# Patient Record
Sex: MALE | Race: WHITE | NOT HISPANIC OR LATINO | Employment: UNEMPLOYED | URBAN - METROPOLITAN AREA
[De-identification: names, ages, dates, MRNs, and addresses within clinical notes are randomized per-mention and may not be internally consistent; named-entity substitution may affect disease eponyms.]

---

## 2017-06-23 ENCOUNTER — TELEPHONE (OUTPATIENT)
Dept: FAMILY MEDICINE | Facility: CLINIC | Age: 67
End: 2017-06-23

## 2017-06-23 NOTE — TELEPHONE ENCOUNTER
Ridge Hutchison is a 67 year old male who calls with right testicle pain/stones, crease/hip area.    NURSING ASSESSMENT:  The pain began 2 years ago, but worse the last 2 month. Intermittent & has been seen by a specialist for it & had an US.    Pain scale (0-10): 5/10  The pain is described as twitch and is located RLQ, which is without radiation.  Symptom associated with the abdominal pain: none.  Patient has not had previous abdominal surgery, including none.  Pain is aggravated by movement & lifting, and relieved by antibiotics.  No fever. He's wondering if it is hip strain/pulled muscle, hernia or infection. He will monitor for fever, vomiting, diarrhea or any other symptoms & will go to  over the weekend. Patient verbalized understanding.       Allergies:   Allergies   Allergen Reactions     No Known Drug Allergies        NURSING PLAN: Nursing advice to patient be seen as scheduled next week.    RECOMMENDED DISPOSITION:    Will comply with recommendation: Yes  If further questions/concerns or if symptoms do not improve, worsen or new symptoms develop, call your PCP or [facility] as soon as possible.    Guideline used:  Telephone Triage Protocols for Nurses, Fifth Edition, Diana Cline RN

## 2017-06-23 NOTE — TELEPHONE ENCOUNTER
Reason for call:  Patient reporting a symptom    Symptom or request: groin pain    Additional comments: GREGG Mari triaging patient.     Phone Number patient can be reached at:  Home number on file [phone] (home)        Call taken on 6/23/2017 at 8:39 AM by Shona Del Valle

## 2017-06-28 ENCOUNTER — OFFICE VISIT (OUTPATIENT)
Dept: FAMILY MEDICINE | Facility: CLINIC | Age: 67
End: 2017-06-28
Payer: MEDICARE

## 2017-06-28 VITALS
BODY MASS INDEX: 28.14 KG/M2 | TEMPERATURE: 97.6 F | DIASTOLIC BLOOD PRESSURE: 86 MMHG | SYSTOLIC BLOOD PRESSURE: 122 MMHG | WEIGHT: 190 LBS | HEART RATE: 72 BPM | HEIGHT: 69 IN

## 2017-06-28 DIAGNOSIS — Z13.1 SCREENING FOR DIABETES MELLITUS: ICD-10-CM

## 2017-06-28 DIAGNOSIS — Z12.5 SPECIAL SCREENING FOR MALIGNANT NEOPLASM OF PROSTATE: ICD-10-CM

## 2017-06-28 DIAGNOSIS — Z11.59 NEED FOR HEPATITIS C SCREENING TEST: ICD-10-CM

## 2017-06-28 DIAGNOSIS — Z13.6 SCREENING FOR HEART DISEASE: ICD-10-CM

## 2017-06-28 DIAGNOSIS — Z12.11 SPECIAL SCREENING FOR MALIGNANT NEOPLASMS, COLON: ICD-10-CM

## 2017-06-28 DIAGNOSIS — Z00.00 ROUTINE GENERAL MEDICAL EXAMINATION AT A HEALTH CARE FACILITY: Primary | ICD-10-CM

## 2017-06-28 DIAGNOSIS — Z23 NEED FOR PROPHYLACTIC VACCINATION AGAINST STREPTOCOCCUS PNEUMONIAE (PNEUMOCOCCUS): ICD-10-CM

## 2017-06-28 PROCEDURE — 90670 PCV13 VACCINE IM: CPT | Performed by: FAMILY MEDICINE

## 2017-06-28 PROCEDURE — G0472 HEP C SCREEN HIGH RISK/OTHER: HCPCS | Performed by: FAMILY MEDICINE

## 2017-06-28 PROCEDURE — 36415 COLL VENOUS BLD VENIPUNCTURE: CPT | Performed by: FAMILY MEDICINE

## 2017-06-28 PROCEDURE — 80061 LIPID PANEL: CPT | Performed by: FAMILY MEDICINE

## 2017-06-28 PROCEDURE — 86803 HEPATITIS C AB TEST: CPT | Performed by: FAMILY MEDICINE

## 2017-06-28 PROCEDURE — G0009 ADMIN PNEUMOCOCCAL VACCINE: HCPCS | Performed by: FAMILY MEDICINE

## 2017-06-28 PROCEDURE — 82947 ASSAY GLUCOSE BLOOD QUANT: CPT | Performed by: FAMILY MEDICINE

## 2017-06-28 PROCEDURE — G0438 PPPS, INITIAL VISIT: HCPCS | Performed by: FAMILY MEDICINE

## 2017-06-28 PROCEDURE — G0103 PSA SCREENING: HCPCS | Performed by: FAMILY MEDICINE

## 2017-06-28 RX ORDER — ASPIRIN 325 MG
TABLET ORAL
COMMUNITY
End: 2024-02-19

## 2017-06-28 NOTE — MR AVS SNAPSHOT
After Visit Summary   6/28/2017    Ridge Hutchison    MRN: [id number]           Patient Information     Date Of Birth          1950        Visit Information        Provider Department      6/28/2017 1:00 PM Jose Delarosa MD [facility]        Today's Diagnoses     Routine general medical examination at a health care facility    -  1    Need for hepatitis C screening test        Need for prophylactic vaccination against Streptococcus pneumoniae (pneumococcus)        Special screening for malignant neoplasm of prostate        Special screening for malignant neoplasms, colon        Screening for diabetes mellitus        Screening for heart disease          Care Instructions      Preventive Health Recommendations:       Male Ages 65 and over    Yearly exam:             See your health care provider every year in order to  o   Review health changes.   o   Discuss preventive care.    o   Review your medicines if your doctor has prescribed any.    Talk with your health care provider about whether you should have a test to screen for prostate cancer (PSA).    Every 3 years, have a diabetes test (fasting glucose). If you are at risk for diabetes, you should have this test more often.    Every 5 years, have a cholesterol test. Have this test more often if you are at risk for high cholesterol or heart disease.     Every 10 years, have a colonoscopy. Or, have a yearly FIT test (stool test). These exams will check for colon cancer.    Talk to with your health care provider about screening for Abdominal Aortic Aneurysm if you have a family history of AAA or have a history of smoking.  Shots:     Get a flu shot each year.     Get a tetanus shot every 10 years.     Talk to your doctor about your pneumonia vaccines. There are now two you should receive - Pneumovax (PPSV 23) and Prevnar (PCV 13).    Talk to your doctor about a shingles vaccine.     Talk to your doctor about the  hepatitis B vaccine.  Nutrition:     Eat at least 5 servings of fruits and vegetables each day.     Eat whole-grain bread, whole-wheat pasta and brown rice instead of white grains and rice.     Talk to your doctor about Calcium and Vitamin D.   Lifestyle    Exercise for at least 150 minutes a week (30 minutes a day, 5 days a week). This will help you control your weight and prevent disease.     Limit alcohol to one drink per day.     No smoking.     Wear sunscreen to prevent skin cancer.     See your dentist every six months for an exam and cleaning.     See your eye doctor every 1 to 2 years to screen for conditions such as glaucoma, macular degeneration and cataracts.    [facility]   Discharged by : barbara  Paper scripts provided to patient : none     If you have any questions regarding your visit please contact your care team:     [facility] Clinic Hours Telephone Number   MEHRDAD T[address]., Dr., Dr.   7am-7pm Monday - Thursday   7am-5pm Fridays  [phone]   (Appointment scheduling available 24/7)   RN Line   [phone] option 2       For a Price Quote for your services, please call our Consumer Price Line at [phone].     What options do I have for visits at the clinic other than the traditional office visit?     To expand how we care for you, many of our providers are utilizing electronic visits (e-visits) and telephone visits, when medically appropriate, for interactions with their patients rather than a visit in the clinic. We also offer nurse visits for many medical concerns. Just like any other service, we will bill your insurance company for this type of visit based on time spent on the phone with your provider. Not all insurance companies cover these visits. Please check with your medical insurance if this type of visit is covered. You will be responsible for any charges that are not paid by your  insurance.   E-visits via [employer]: generally incur a $35.00 fee.     Telephone visits:   Time spent on the phone: *charged based on time that is spent on the phone in increments of 10 minutes. Estimated cost:   5-10 mins $30.00   11-20 mins. $59.00   21-30 mins. $85.00     Use [employer] (secure email communication and access to your chart) to send your primary care provider a message or make an appointment. Ask someone on your Team how to sign up for [employer].     As always, Thank you for trusting us with your health care needs!      [facility]:    Scheduling Appointments  Ayad Vazquez [facility]  Call: [phone]    [facility]  Call: [phone]    [facility]  Call: [phone]      WHERE TO GO FOR CARE?    Clinic    Make an appointment if you:       Are sick (cold, cough, flu, sore throat, earache or in pain).       Have a small injury (sprain, small cut, burn or broken bone).       Need a physical exam, Pap smear, vaccine or prescription refill.       Have questions about your health or medicines.    To reach us:      Call [phone] ([phone]). Open 24 hours every day. (For counseling services, call [phone].)    Log into [employer] at [email or web address]. (Visit [email or web address] to create an account.) Hospital emergency room    An emergency is a serious or life- threatening problem that must be treated right away.    Call 648 or get to the hospital if you have:      Very bad or sudden:            - Chest pain or pressure         - Bleeding         - Head or belly pain         - Dizziness or trouble seeing, walking or                          Speaking      Problems breathing      Blood in your vomit or you are coughing up blood      A major injury (knocked out, loss of a finger or limb, rape, broken bone protruding from skin)    A mental health crisis. (Or call the Mental Health Crisis line at 1-434.616.8195 or  Suicide Prevention Hotline at 1-620.348.9649.)    Open 24 hours every day. You don't need an appointment.     Urgent care    Visit urgent care for sickness or small injuries when the clinic is closed. You don't need an appointment. To check hours or find an urgent care near you, visit [email or web address]. Online care    Get online care from [facility] for more than 70 common problems, like colds, allergies and infections. Open 24 hours every day at:   [email or web address]   Need help deciding?    For advice about where to be seen, you may call your clinic and ask to speak with a nurse. We're here for you 24 hours every day.         If you are deaf or hard of hearing, please let us know. We provide many free services including sign language interpreters, oral interpreters, TTYs, telephone amplifiers, note takers and written materials.                         Follow-ups after your visit        Future tests that were ordered for you today     Open Future Orders        Priority Expected Expires Ordered    Fecal colorectal cancer screen (FIT) Routine 7/19/2017 9/20/2017 6/28/2017            Who to contact     If you have questions or need follow up information about today's clinic visit or your schedule please contact [facility] directly at [phone].  Normal or non-critical lab and imaging results will be communicated to you by MyChart, letter or phone within 4 business days after the clinic has received the results. If you do not hear from us within 7 days, please contact the clinic through [employer] or phone. If you have a critical or abnormal lab result, we will notify you by phone as soon as possible.  Submit refill requests through [employer] or call your pharmacy and they will forward the refill request to us. Please allow 3 business days for your refill to be completed.          Additional Information About Your Visit        [employer] Information     [employer] gives you secure access to your electronic health  "record. If you see a primary care provider, you can also send messages to your care team and make appointments. If you have questions, please call your primary care clinic.  If you do not have a primary care provider, please call [phone] and they will assist you.        Care EveryWhere ID     This is your Care EveryWhere ID. This could be used by other organizations to access your [place] medical records  [id number]        Your Vitals Were     Pulse Temperature Height BMI (Body Mass Index)          72 97.6  F (36.4  C) (Oral) 5' 8.75\" (1.746 m) 28.26 kg/m2         Blood Pressure from Last 3 Encounters:   06/28/17 122/86   07/09/14 104/60   07/09/14 104/60    Weight from Last 3 Encounters:   06/28/17 190 lb (86.2 kg)   07/09/14 190 lb (86.2 kg)   07/09/14 190 lb (86.2 kg)              We Performed the Following     Glucose     Hepatitis C Screen Reflex to HCV RNA Quant and Genotype     Lipid Profile with reflex to direct LDL     Pneumococcal vaccine 13 valent PCV13 IM (Prevnar) [02590]     PSA, screen          Today's Medication Changes          These changes are accurate as of: 6/28/17  1:56 PM.  If you have any questions, ask your nurse or doctor.               These medicines have changed or have updated prescriptions.        Dose/Directions    aspirin 325 MG tablet   This may have changed:  Another medication with the same name was removed. Continue taking this medication, and follow the directions you see here.   Changed by:  Jose Delarosa MD        Refills:  0                Primary Care Provider Office Phone # Fax #    Radha DO Wallace [phone]       [address] [address]        Equal Access to Services     ZAFAR NASH : Chidi Valle, griselda townsend, meera jiang. So [facility] [phone].    ATENCIÓN: Si habla español, tiene a borges disposición servicios " yandy de asistencia lingüística. Óscar oconnor [phone].    We comply with applicable federal civil rights laws and [place] laws. We do not discriminate on the basis of race, color, national origin, age, disability sex, sexual orientation or gender identity.            Thank you!     Thank you for choosing [facility]  for your care. Our goal is always to provide you with excellent care. Hearing back from our patients is one way we can continue to improve our services. Please take a few minutes to complete the written survey that you may receive in the mail after your visit with us. Thank you!             Your Updated Medication List - Protect others around you: Learn how to safely use, store and throw away your medicines at www.disposemymeds.org.          This list is accurate as of: 6/28/17  1:56 PM.  Always use your most recent med list.                   Brand Name Dispense Instructions for use Diagnosis    ADVIL PO      Take 1-2 tablets by mouth as needed for moderate pain        aspirin 325 MG tablet

## 2017-06-28 NOTE — NURSING NOTE
"Chief Complaint   Patient presents with     Wellness Visit       Initial /86  Pulse 72  Temp 97.6  F (36.4  C) (Oral)  Ht 5' 8.75\" (1.746 m)  Wt 190 lb (86.2 kg)  BMI 28.26 kg/m2 Estimated body mass index is 28.26 kg/(m^2) as calculated from the following:    Height as of this encounter: 5' 8.75\" (1.746 m).    Weight as of this encounter: 190 lb (86.2 kg).  Medication Reconciliation: complete   Kimmy Camejo CMA (AAMA)      "

## 2017-06-28 NOTE — PATIENT INSTRUCTIONS
Preventive Health Recommendations:       Male Ages 65 and over    Yearly exam:             See your health care provider every year in order to  o   Review health changes.   o   Discuss preventive care.    o   Review your medicines if your doctor has prescribed any.    Talk with your health care provider about whether you should have a test to screen for prostate cancer (PSA).    Every 3 years, have a diabetes test (fasting glucose). If you are at risk for diabetes, you should have this test more often.    Every 5 years, have a cholesterol test. Have this test more often if you are at risk for high cholesterol or heart disease.     Every 10 years, have a colonoscopy. Or, have a yearly FIT test (stool test). These exams will check for colon cancer.    Talk to with your health care provider about screening for Abdominal Aortic Aneurysm if you have a family history of AAA or have a history of smoking.  Shots:     Get a flu shot each year.     Get a tetanus shot every 10 years.     Talk to your doctor about your pneumonia vaccines. There are now two you should receive - Pneumovax (PPSV 23) and Prevnar (PCV 13).    Talk to your doctor about a shingles vaccine.     Talk to your doctor about the hepatitis B vaccine.  Nutrition:     Eat at least 5 servings of fruits and vegetables each day.     Eat whole-grain bread, whole-wheat pasta and brown rice instead of white grains and rice.     Talk to your doctor about Calcium and Vitamin D.   Lifestyle    Exercise for at least 150 minutes a week (30 minutes a day, 5 days a week). This will help you control your weight and prevent disease.     Limit alcohol to one drink per day.     No smoking.     Wear sunscreen to prevent skin cancer.     See your dentist every six months for an exam and cleaning.     See your eye doctor every 1 to 2 years to screen for conditions such as glaucoma, macular degeneration and cataracts.    [facility]   Discharged by :  barbara  Paper scripts provided to patient : none     If you have any questions regarding your visit please contact your care team:     [facility] Clinic Hours Telephone Number   MEHRDAD Tello Dr., Dr., Dr.   7am-7pm Monday - Thursday   7am-5pm Fridays  [phone]   (Appointment scheduling available 24/7)   RN Line   [phone] option 2       For a Price Quote for your services, please call our Consumer Price Line at [phone].     What options do I have for visits at the clinic other than the traditional office visit?     To expand how we care for you, many of our providers are utilizing electronic visits (e-visits) and telephone visits, when medically appropriate, for interactions with their patients rather than a visit in the clinic. We also offer nurse visits for many medical concerns. Just like any other service, we will bill your insurance company for this type of visit based on time spent on the phone with your provider. Not all insurance companies cover these visits. Please check with your medical insurance if this type of visit is covered. You will be responsible for any charges that are not paid by your insurance.   E-visits via [employer]: generally incur a $35.00 fee.     Telephone visits:   Time spent on the phone: *charged based on time that is spent on the phone in increments of 10 minutes. Estimated cost:   5-10 mins $30.00   11-20 mins. $59.00   21-30 mins. $85.00     Use [employer] (secure email communication and access to your chart) to send your primary care provider a message or make an appointment. Ask someone on your Team how to sign up for [employer].     As always, Thank you for trusting us with your health care needs!      [facility]:    Scheduling Appointments  Ayad Vazquez [place]  Call: [phone]    [facility]  Call: [phone]    [facility]  [facility]  Call: [phone]      WHERE TO GO FOR CARE?    Clinic    Make an appointment if you:       Are sick (cold, cough, flu, sore throat, earache or in pain).       Have a small injury (sprain, small cut, burn or broken bone).       Need a physical exam, Pap smear, vaccine or prescription refill.       Have questions about your health or medicines.    To reach us:      Call [phone] ([phone]). Open 24 hours every day. (For counseling services, call [phone].)    Log into [employer] at [employer]. (Visit [employer] to create an account.) Hospital emergency room    An emergency is a serious or life- threatening problem that must be treated right away.    Call 241 or get to the hospital if you have:      Very bad or sudden:            - Chest pain or pressure         - Bleeding         - Head or belly pain         - Dizziness or trouble seeing, walking or                          Speaking      Problems breathing      Blood in your vomit or you are coughing up blood      A major injury (knocked out, loss of a finger or limb, rape, broken bone protruding from skin)    A mental health crisis. (Or call the Mental Health Crisis line at 1-970.526.5292 or Suicide Prevention Hotline at 1-435.946.9075.)    Open 24 hours every day. You don't need an appointment.     Urgent care    Visit urgent care for sickness or small injuries when the clinic is closed. You don't need an appointment. To check hours or find an urgent care near you, visit [email or web address]. Online care    Get online care from [facility] for more than 70 common problems, like colds, allergies and infections. Open 24 hours every day at:   [email or web address]   Need help deciding?    For advice about where to be seen, you may call your clinic and ask to speak with a nurse. We're here for you 24 hours every day.         If you are deaf or hard of hearing, please let us know. We provide many free services including  sign language interpreters, oral interpreters, TTYs, telephone amplifiers, note takers and written materials.

## 2017-06-28 NOTE — PROGRESS NOTES
SUBJECTIVE:   Ridge Hutchison is a 67 year old male who presents for Preventive Visit.      Are you in the first 12 months of your Medicare Part B coverage?  No    Healthy Habits:    Do you get at least three servings of calcium containing foods daily (dairy, green leafy vegetables, etc.)? no    Amount of exercise or daily activities, outside of work: walks 3 day(s) per week    Problems taking medications regularly No    Medication side effects: No    Have you had an eye exam in the past two years? yes    Do you see a dentist twice per year? yes    Do you have sleep apnea, excessive snoring or daytime drowsiness?no    COGNITIVE SCREEN  1) Repeat 3 items (Banana, Sunrise, Chair)    2) Clock draw: NORMAL  3) 3 item recall: Recalls 3 objects  Results: NORMAL clock, 3 items recalled: COGNITIVE IMPAIRMENT LESS LIKELY    Mini-CogTM Copyright S Ina. Licensed by the author for use in [facility]; reprinted with permission (keren@[facility]). All rights reserved.          * intermittent groin pain for the past 3 months.  He has had much longer standing issues with two lumps in the upper right scrotum.  When they bother him he might have some aching in the testicle as well as radiation into the right lower abdomen.    Reviewed and updated as needed this visit by clinical staff  Tobacco  Allergies  Meds  Problems  Med Hx  Surg Hx  Fam Hx  Soc Hx          Reviewed and updated as needed this visit by Provider  Tobacco  Allergies  Meds  Problems  Med Hx  Surg Hx  Fam Hx  Soc Hx         Social History   Substance Use Topics     Smoking status: Never Smoker     Smokeless tobacco: Never Used     Alcohol use Yes      Comment: Once every 2 months       The patient does not drink >3 drinks per day nor >7 drinks per week.    Today's PHQ-2 Score:   PHQ-2 ( 1999 Pfizer) 6/28/2017 6/27/2014   Q1: Little interest or pleasure in doing things 0 0   Q2: Feeling down, depressed or hopeless 0 0   PHQ-2 Score 0 0        Do you feel safe in your environment - No    Do you have a Health Care Directive?: No: Advance care planning was reviewed with patient; patient declined at this time.    Current providers sharing in care for this patient include:   Patient Care Team:  Radha Gonsalez DO as PCP - General (Family Practice)      Hearing impairment: No    Ability to successfully perform activities of daily living: Yes, no assistance needed     Fall risk:  Fallen 2 or more times in the past year?: No  Any fall with injury in the past year?: No    Home safety:  lack of grab bars in the bathroom      The following health maintenance items are reviewed in Epic and correct as of today:  Health Maintenance   Topic Date Due     ADVANCE DIRECTIVE PLANNING Q5 YRS  05/11/1969     HEPATITIS C SCREENING  05/11/1969     FALL RISK ASSESSMENT  05/11/2016     PNEUMOCOCCAL (1 of 2 - PCV13) 05/11/2016     AORTIC ANEURYSM SCREENING (SYSTEM ASSIGNED)  05/11/2016     INFLUENZA VACCINE (SYSTEM ASSIGNED)  09/01/2017     LIPID SCREEN Q5 YR MALE (SYSTEM ASSIGNED)  06/27/2019     COLONOSCOPY Q10 YR  07/20/2021     TETANUS Q10 YR  07/09/2024     BP Readings from Last 3 Encounters:   06/28/17 122/86   07/09/14 104/60   07/09/14 104/60    Wt Readings from Last 3 Encounters:   06/28/17 190 lb (86.2 kg)   07/09/14 190 lb (86.2 kg)   07/09/14 190 lb (86.2 kg)                  Patient Active Problem List   Diagnosis     Disorder of lipoid metabolism     Hyperlipidemia with target LDL less than 130     Atypical nevus     Past Surgical History:   Procedure Laterality Date     C NONSPECIFIC PROCEDURE  1995    right leg fibroid tumor removal     HC REMOVAL OF TONSILS,<13 Y/O         Social History   Substance Use Topics     Smoking status: Never Smoker     Smokeless tobacco: Never Used     Alcohol use Yes      Comment: Once every 2 months     Family History   Problem Relation Age of Onset     C.A.D. Father      Hypertension Father      Eye Disorder Father      macular  "degeneration     Heart Failure Father      Genitourinary Problems Mother      bladder falling     Dementia Mother       age 93     HEART DISEASE Maternal Grandmother       from heart disease,     CANCER Paternal Grandfather      bone     C.A.D. Paternal Grandmother      Arthritis Sister      Multiple Sclerosis Sister          Current Outpatient Prescriptions   Medication Sig Dispense Refill     aspirin 325 MG tablet        Ibuprofen (ADVIL PO) Take 1-2 tablets by mouth as needed for moderate pain       Allergies   Allergen Reactions     No Known Drug Allergies        Pneumonia Vaccine:Adults age 65+ who have not received previous Pneumovax (PPSV23) or PCV13 as an adult: Should first be given PCV13 AND then should be given PPSV23 6-12 months after PCV13    ROS:  Constitutional, HEENT, cardiovascular, pulmonary, GI, , musculoskeletal, neuro, skin, endocrine and psych systems are negative, except as otherwise noted.    OBJECTIVE:   /86  Pulse 72  Temp 97.6  F (36.4  C) (Oral)  Ht 5' 8.75\" (1.746 m)  Wt 190 lb (86.2 kg)  BMI 28.26 kg/m2 Estimated body mass index is 28.26 kg/(m^2) as calculated from the following:    Height as of this encounter: 5' 8.75\" (1.746 m).    Weight as of this encounter: 190 lb (86.2 kg).  EXAM:   GENERAL: healthy, alert and no distress  EYES: Eyes grossly normal to inspection, PERRL and conjunctivae and sclerae normal  HENT: ear canals and TM's normal, nose and mouth without ulcers or lesions  NECK: no adenopathy, no asymmetry, masses, or scars and thyroid normal to palpation  RESP: lungs clear to auscultation - no rales, rhonchi or wheezes  CV: regular rate and rhythm, normal S1 S2, no S3 or S4, no murmur, click or rub, no peripheral edema and peripheral pulses strong  ABDOMEN: soft, nontender, no hepatosplenomegaly, no masses and bowel sounds normal   (male): normal male genitalia without lesions or urethral discharge, no hernia   (male): small epididymal cysts are " noted along the right cord that seem to correspond to the patient's symptoms.  MS: no gross musculoskeletal defects noted, no edema  SKIN: no suspicious lesions or rashes  NEURO: Normal strength and tone, mentation intact and speech normal  PSYCH: mentation appears normal, affect normal/bright    ASSESSMENT / PLAN:   1. Routine general medical examination at a health care facility  Routine wellness issues were reviewed.  He has had some Lif[employer] screening done and those results were also reviewed.    2. Need for hepatitis C screening test  One time test done today.  - Hepatitis C Screen Reflex to HCV RNA Quant and Genotype    3. Need for prophylactic vaccination against Streptococcus pneumoniae (pneumococcus)  pcv 13 today.  ppsv next year.  - Pneumococcal vaccine 13 valent PCV13 IM (Prevnar) [56820]    4. Special screening for malignant neoplasm of prostate  Discussed and done today.  - PSA, screen    5. Special screening for malignant neoplasms, colon  Discussed and done, recommended annually.  - Fecal colorectal cancer screen (FIT); Future    6. Screening for diabetes mellitus  Done today after discussion.  - Glucose    7. Screening for heart disease  Will check today.  Some elevation in the past.  - Lipid Profile with reflex to direct LDL    End of Life Planning:  Patient currently has an advanced directive: Yes.  Practitioner is supportive of decision.    COUNSELING:  Reviewed preventive health counseling, as reflected in patient instructions       Regular exercise       Healthy diet/nutrition       Vision screening       Dental care       Hepatitis C screening       Colon cancer screening       Prostate cancer screening    BP Screening:   Last 3 BP Readings:    BP Readings from Last 3 Encounters:   06/28/17 122/86   07/09/14 104/60   07/09/14 104/60       The following was recommended to the patient:  Re-screen BP within a year and recommended lifestyle modifications    Estimated body mass index is 28.26  "kg/(m^2) as calculated from the following:    Height as of this encounter: 5' 8.75\" (1.746 m).    Weight as of this encounter: 190 lb (86.2 kg).     reports that he has never smoked. He has never used smokeless tobacco.      Appropriate preventive services were discussed with this patient, including applicable screening as appropriate for cardiovascular disease, diabetes, osteopenia/osteoporosis, and glaucoma.  As appropriate for age/gender, discussed screening for colorectal cancer, prostate cancer, breast cancer, and cervical cancer. Checklist reviewing preventive services available has been given to the patient.    Reviewed patients plan of care and provided an AVS. The Basic Care Plan (routine screening as documented in Health Maintenance) for Ridge meets the Care Plan requirement. This Care Plan has been established and reviewed with the Patient.    Counseling Resources:  ATP IV Guidelines  Pooled Cohorts Equation Calculator  Breast Cancer Risk Calculator  FRAX Risk Assessment  ICSI Preventive Guidelines  Dietary Guidelines for Americans, 2010  USDA's MyPlate  ASA Prophylaxis  Lung CA Screening    Jose Delarosa MD  [facility]  "

## 2017-06-28 NOTE — NURSING NOTE
Prior to injection verified patient identity using patient's name and date of birth.    Screening Questionnaire for Adult Immunization    Are you sick today?   No   Do you have allergies to medications, food, a vaccine component or latex?   No   Have you ever had a serious reaction after receiving a vaccination?   No   Do you have a long-term health problem with heart disease, lung disease, asthma, kidney disease, metabolic disease (e.g. diabetes), anemia, or other blood disorder?   No   Do you have cancer, leukemia, HIV/AIDS, or any other immune system problem?   No   In the past 3 months, have you taken medications that affect  your immune system, such as prednisone, other steroids, or anticancer drugs; drugs for the treatment of rheumatoid arthritis, Crohn s disease, or psoriasis; or have you had radiation treatments?   No   Have you had a seizure, or a brain or other nervous system problem?   No   During the past year, have you received a transfusion of blood or blood     products, or been given immune (gamma) globulin or antiviral drug?   No   For women: Are you pregnant or is there a chance you could become        pregnant during the next month?   No   Have you received any vaccinations in the past 4 weeks?   No     Immunization questionnaire answers were all negative.      MNVFC doesn't apply on this patient    Per orders of Dr. Delarosa, injection of PCV 13 given by Romelia Livingston. Patient instructed to remain in clinic for 20 minutes afterwards, and to report any adverse reaction to me immediately.       Screening performed by Romelia Livingston on 6/28/2017 at 2:05 PM.

## 2017-06-29 LAB
CHOLEST SERPL-MCNC: 216 MG/DL
GLUCOSE SERPL-MCNC: 81 MG/DL (ref 70–99)
HCV AB SERPL QL IA: NORMAL
HDLC SERPL-MCNC: 49 MG/DL
LDLC SERPL CALC-MCNC: 152 MG/DL
NONHDLC SERPL-MCNC: 167 MG/DL
PSA SERPL-ACNC: 2.3 UG/L (ref 0–4)
TRIGL SERPL-MCNC: 76 MG/DL

## 2017-07-05 PROCEDURE — 82274 ASSAY TEST FOR BLOOD FECAL: CPT | Performed by: FAMILY MEDICINE

## 2017-07-06 DIAGNOSIS — Z12.11 SPECIAL SCREENING FOR MALIGNANT NEOPLASMS, COLON: ICD-10-CM

## 2017-07-07 LAB — HEMOCCULT STL QL IA: NEGATIVE

## 2017-08-14 ENCOUNTER — OFFICE VISIT (OUTPATIENT)
Dept: OPTOMETRY | Facility: CLINIC | Age: 67
End: 2017-08-14
Payer: MEDICARE

## 2017-08-14 DIAGNOSIS — H52.203 MYOPIA OF BOTH EYES WITH ASTIGMATISM AND PRESBYOPIA: Primary | ICD-10-CM

## 2017-08-14 DIAGNOSIS — H52.4 MYOPIA OF BOTH EYES WITH ASTIGMATISM AND PRESBYOPIA: Primary | ICD-10-CM

## 2017-08-14 DIAGNOSIS — H52.13 MYOPIA OF BOTH EYES WITH ASTIGMATISM AND PRESBYOPIA: Primary | ICD-10-CM

## 2017-08-14 PROCEDURE — 92015 DETERMINE REFRACTIVE STATE: CPT | Mod: GY | Performed by: OPTOMETRIST

## 2017-08-14 PROCEDURE — 92004 COMPRE OPH EXAM NEW PT 1/>: CPT | Performed by: OPTOMETRIST

## 2017-08-14 ASSESSMENT — TONOMETRY
OD_IOP_MMHG: 14
OS_IOP_MMHG: 16
IOP_METHOD: APPLANATION

## 2017-08-14 ASSESSMENT — REFRACTION_MANIFEST
OS_ADD: +2.50
OD_ADD: +2.50
OD_SPHERE: -0.75
OS_AXIS: 150
OS_SPHERE: -2.50
OD_AXIS: 035
OS_CYLINDER: +1.00
OD_CYLINDER: +0.25

## 2017-08-14 ASSESSMENT — CUP TO DISC RATIO
OS_RATIO: 0.3
OD_RATIO: 0.3

## 2017-08-14 ASSESSMENT — VISUAL ACUITY
OS_CC: 20/25
OD_SC+: -1
OS_SC: 20/200
OD_SC: 20/30
METHOD: SNELLEN - LINEAR
CORRECTION_TYPE: GLASSES
OD_CC: 20/200
OD_SC: 20/80
OD_CC+: -1
OD_CC: 20/20
OS_SC: 20/40
OS_CC: 20/120

## 2017-08-14 ASSESSMENT — REFRACTION_WEARINGRX
OD_AXIS: 025
OD_SPHERE: -1.00
OS_AXIS: 005
OS_SPHERE: -3.25
SPECS_TYPE: SVL
OS_CYLINDER: +1.00
OD_CYLINDER: +0.25

## 2017-08-14 ASSESSMENT — SLIT LAMP EXAM - LIDS
COMMENTS: NORMAL
COMMENTS: NORMAL

## 2017-08-14 ASSESSMENT — CONF VISUAL FIELD
OS_NORMAL: 1
OD_NORMAL: 1

## 2017-08-14 ASSESSMENT — EXTERNAL EXAM - LEFT EYE: OS_EXAM: NORMAL

## 2017-08-14 ASSESSMENT — EXTERNAL EXAM - RIGHT EYE: OD_EXAM: NORMAL

## 2017-08-14 NOTE — PROGRESS NOTES
Chief Complaint   Patient presents with     COMPREHENSIVE EYE EXAM      Accompanied by self  Last Eye Exam: 5 year ago  Dilated Previously: Yes    What are you currently using to see?  glasses       Distance Vision Acuity: Satisfied with vision    Near Vision Acuity: Satisfied with vision while reading  with glasses with good lighting    Eye Comfort: good  Do you use eye drops? : No  Occupation or Hobbies: semi retired    Sugey Jacobo, Optometric Tech          Medical, surgical and family histories reviewed and updated 8/14/2017.       OBJECTIVE: See Ophthalmology exam    ASSESSMENT:    ICD-10-CM    1. Myopia of both eyes with astigmatism and presbyopia H52.13 EYE EXAM (SIMPLE-NONBILLABLE)    H52.203 REFRACTION    H52.4       PLAN:   A final glasses prescription was given.  Allow time for adaptation.  The glasses may cause dizziness and affect depth perception for awhile.  Return to clinic 1 year for Comprehensive Vision Exam      Amber Herbert O.D  [facility]  [address] [place] [address]. [place]  [place]  [address]    [phone]

## 2017-08-14 NOTE — PATIENT INSTRUCTIONS
A final glasses prescription was given.  Allow time for adaptation.  The glasses may cause dizziness and affect depth perception for awhile.  Return to clinic 1 year for Comprehensive Vision Exam      Amber Herbert O.D  [address]  [place]  [address]    [phone]

## 2017-08-14 NOTE — MR AVS SNAPSHOT
After Visit Summary   8/14/2017    Ridge Hutchison    MRN: [id number]           Patient Information     Date Of Birth          1950        Visit Information        Provider Department      8/14/2017 11:30 AM Amber Herbert OD [facility]        Today's Diagnoses     Myopia of both eyes with astigmatism and presbyopia    -  1      Care Instructions        A final glasses prescription was given.  Allow time for adaptation.  The glasses may cause dizziness and affect depth perception for awhile.  Return to clinic 1 year for Comprehensive Vision Exam      Amber Herbert O.D  [facility]  [address] [place] [address]. [place]  [address]    [phone]                  Follow-ups after your visit        Follow-up notes from your care team     Return in about 1 year (around 8/14/2018) for Eye Exam.      Who to contact     If you have questions or need follow up information about today's clinic visit or your schedule please contact [facility] directly at [phone].  Normal or non-critical lab and imaging results will be communicated to you by Next[employer], letter or phone within 4 business days after the clinic has received the results. If you do not hear from us within 7 days, please contact the clinic through NextIOhart or phone. If you have a critical or abnormal lab result, we will notify you by phone as soon as possible.  Submit refill requests through [employer] or call your pharmacy and they will forward the refill request to us. Please allow 3 business days for your refill to be completed.          Additional Information About Your Visit        NextIOhart Information     [employer] gives you secure access to your electronic health record. If you see a primary care provider, you can also send messages to your care team and make appointments. If you have questions, please call your primary care clinic.  If you do not have a primary care provider, please call  [phone] and they will assist you.        Care EveryWhere ID     This is your Care EveryWhere ID. This could be used by other organizations to access your [facility] medical records  [id number]         Blood Pressure from Last 3 Encounters:   06/28/17 122/86   07/09/14 104/60   07/09/14 104/60    Weight from Last 3 Encounters:   06/28/17 86.2 kg (190 lb)   07/09/14 86.2 kg (190 lb)   07/09/14 86.2 kg (190 lb)              We Performed the Following     EYE EXAM (SIMPLE-NONBILLABLE)     REFRACTION        Primary Care Provider Office Phone # Fax #    Radha Gonsalez [phone]       [address] [facility] [address]        Equal Access to Services     ZAFAR NASH : Hadii aad ku hadasho Soomaali, waaxda luqadaha, qaybta kaalmada adeegyada, meera oliva. So [facility] [phone].    ATENCIÓN: Si habla español, tiene a borges disposición servicios gratuitos de asistencia lingüística. Llame al [phone].    We comply with applicable federal civil rights laws and [place] laws. We do not discriminate on the basis of race, color, national origin, age, disability sex, sexual orientation or gender identity.            Thank you!     Thank you for choosing [facility]  for your care. Our goal is always to provide you with excellent care. Hearing back from our patients is one way we can continue to improve our services. Please take a few minutes to complete the written survey that you may receive in the mail after your visit with us. Thank you!             Your Updated Medication List - Protect others around you: Learn how to safely use, store and throw away your medicines at www.disposemymeds.org.          This list is accurate as of: 8/14/17 12:49 PM.  Always use your most recent med list.                   Brand Name Dispense Instructions for use Diagnosis    ADVIL PO      Take 1-2 tablets by mouth as needed for moderate pain        aspirin 325 MG tablet

## 2018-06-20 ENCOUNTER — RADIANT APPOINTMENT (OUTPATIENT)
Dept: GENERAL RADIOLOGY | Facility: CLINIC | Age: 68
End: 2018-06-20
Attending: FAMILY MEDICINE
Payer: MEDICARE

## 2018-06-20 ENCOUNTER — OFFICE VISIT (OUTPATIENT)
Dept: FAMILY MEDICINE | Facility: CLINIC | Age: 68
End: 2018-06-20
Payer: MEDICARE

## 2018-06-20 VITALS
TEMPERATURE: 98.6 F | BODY MASS INDEX: 27.99 KG/M2 | HEART RATE: 76 BPM | DIASTOLIC BLOOD PRESSURE: 76 MMHG | SYSTOLIC BLOOD PRESSURE: 128 MMHG | HEIGHT: 69 IN | WEIGHT: 189 LBS

## 2018-06-20 DIAGNOSIS — M25.561 ACUTE PAIN OF RIGHT KNEE: ICD-10-CM

## 2018-06-20 DIAGNOSIS — Z12.5 SPECIAL SCREENING FOR MALIGNANT NEOPLASM OF PROSTATE: ICD-10-CM

## 2018-06-20 DIAGNOSIS — Z12.11 SCREEN FOR COLON CANCER: ICD-10-CM

## 2018-06-20 DIAGNOSIS — Z13.6 SCREENING FOR AAA (ABDOMINAL AORTIC ANEURYSM): ICD-10-CM

## 2018-06-20 DIAGNOSIS — E78.5 HYPERLIPIDEMIA WITH TARGET LDL LESS THAN 130: ICD-10-CM

## 2018-06-20 DIAGNOSIS — Z00.00 ROUTINE GENERAL MEDICAL EXAMINATION AT A HEALTH CARE FACILITY: Primary | ICD-10-CM

## 2018-06-20 PROCEDURE — 99213 OFFICE O/P EST LOW 20 MIN: CPT | Mod: 25 | Performed by: FAMILY MEDICINE

## 2018-06-20 PROCEDURE — G0439 PPPS, SUBSEQ VISIT: HCPCS | Performed by: FAMILY MEDICINE

## 2018-06-20 PROCEDURE — 73562 X-RAY EXAM OF KNEE 3: CPT | Mod: RT

## 2018-06-20 ASSESSMENT — ACTIVITIES OF DAILY LIVING (ADL)
CURRENT_FUNCTION: NO ASSISTANCE NEEDED
I_NEED_ASSISTANCE_FOR_THE_FOLLOWING_DAILY_ACTIVITIES:: NO ASSISTANCE IS NEEDED

## 2018-06-20 NOTE — MR AVS SNAPSHOT
After Visit Summary   6/20/2018    Ridge Hutchison    MRN: [id number]           Patient Information     Date Of Birth          1950        Visit Information        Provider Department      6/20/2018 11:20 AM Jose Delarosa MD [facility]        Today's Diagnoses     Routine general medical examination at a health care facility    -  1    Screen for colon cancer        Screening for AAA (abdominal aortic aneurysm)        Acute pain of right knee        Hyperlipidemia with target LDL less than 130        Special screening for malignant neoplasm of prostate          Care Instructions      Preventive Health Recommendations:   Male Ages 65 and over    Yearly exam:             See your health care provider every year in order to  o   Review health changes.   o   Discuss preventive care.    o   Review your medicines if your doctor has prescribed any.    Talk with your health care provider about whether you should have a test to screen for prostate cancer (PSA).    Every 3 years, have a diabetes test (fasting glucose). If you are at risk for diabetes, you should have this test more often.    Every 5 years, have a cholesterol test. Have this test more often if you are at risk for high cholesterol or heart disease.     Every 10 years, have a colonoscopy. Or, have a yearly FIT test (stool test). These exams will check for colon cancer.    Talk to with your health care provider about screening for Abdominal Aortic Aneurysm if you have a family history of AAA or have a history of smoking.    Shots:     Get a flu shot each year.     Get a tetanus shot every 10 years.     Talk to your doctor about your pneumonia vaccines. There are now two you should receive - Pneumovax (PPSV 23) and Prevnar (PCV 13).     Talk to your doctor about a shingles vaccine.     Talk to your doctor about the hepatitis B vaccine.  Nutrition:     Eat at least 5 servings of fruits and vegetables each day.      Eat whole-grain bread, whole-wheat pasta and brown rice instead of white grains and rice.     Talk to your provider about Calcium and Vitamin D.   Lifestyle    Exercise for at least 150 minutes a week (30 minutes a day, 5 days a week). This will help you control your weight and prevent disease.     Limit alcohol to one drink per day.     No smoking.     Wear sunscreen to prevent skin cancer.     See your dentist every six months for an exam and cleaning.     See your eye doctor every 1 to 2 years to screen for conditions such as glaucoma, macular degeneration, cataracts, etc           Follow-ups after your visit        Follow-up notes from your care team     Return in about 4 weeks (around 7/18/2018) for Lab Work.      Future tests that were ordered for you today     Open Future Orders        Priority Expected Expires Ordered    Lipid panel reflex to direct LDL Fasting Routine  8/20/2018 6/20/2018    Comprehensive metabolic panel (BMP + Alb, Alk Phos, ALT, AST, Total. Bili, TP) Routine  8/20/2018 6/20/2018    PSA, screen Routine  8/20/2018 6/20/2018    Fecal colorectal cancer screen FIT - Future (S+30) Routine 7/11/2018 7/20/2018 6/20/2018            Who to contact     If you have questions or need follow up information about today's clinic visit or your schedule please contact [facility] directly at [phone].  Normal or non-critical lab and imaging results will be communicated to you by MyChart, letter or phone within 4 business days after the clinic has received the results. If you do not hear from us within 7 days, please contact the clinic through MyChart or phone. If you have a critical or abnormal lab result, we will notify you by phone as soon as possible.  Submit refill requests through [employer] or call your pharmacy and they will forward the refill request to us. Please allow 3 business days for your refill to be completed.          Additional Information About Your Visit       "  MyChart Information     [employer] gives you secure access to your electronic health record. If you see a primary care provider, you can also send messages to your care team and make appointments. If you have questions, please call your primary care clinic.  If you do not have a primary care provider, please call [phone] and they will assist you.        Care EveryWhere ID     This is your Care EveryWhere ID. This could be used by other organizations to access your [facility] medical records  [id number]        Your Vitals Were     Pulse Temperature Height BMI (Body Mass Index)          76 98.6  F (37  C) (Oral) 5' 8.75\" (1.746 m) 28.11 kg/m2         Blood Pressure from Last 3 Encounters:   06/20/18 128/76   06/28/17 122/86   07/09/14 104/60    Weight from Last 3 Encounters:   06/20/18 189 lb (85.7 kg)   06/28/17 190 lb (86.2 kg)   07/09/14 190 lb (86.2 kg)              We Performed the Following     Abdominal Aortic Aneurysm Screening/Tracking        Primary Care Provider Office Phone # Fax #    Radha DO Wallace [phone]       [address] [facility] [address]        Equal Access to Services     ZAFAR NASH AH: Hadii aad ku hadasho Soomaali, waaxda luqadaha, qaybta kaalmada adeegyada, waxay idiin haysaschan sagar wallis laanyi oliva. So [facility] [phone].    ATENCIÓN: Si habla español, tiene a borges disposición servicios gratuitos de asistencia lingüística. Llame al [phone].    We comply with applicable federal civil rights laws and [place] laws. We do not discriminate on the basis of race, color, national origin, age, disability, sex, sexual orientation, or gender identity.            Thank you!     Thank you for choosing [facility]  for your care. Our goal is always to provide you with excellent care. Hearing back from our patients is one way we can continue to improve our services. Please take a few minutes to complete the written survey that you may receive in the mail " after your visit with us. Thank you!             Your Updated Medication List - Protect others around you: Learn how to safely use, store and throw away your medicines at www.disposemymeds.org.          This list is accurate as of 6/20/18 12:39 PM.  Always use your most recent med list.                   Brand Name Dispense Instructions for use Diagnosis    ADVIL PO      Take 1-2 tablets by mouth as needed for moderate pain        aspirin 325 MG tablet

## 2018-06-21 ENCOUNTER — MYC MEDICAL ADVICE (OUTPATIENT)
Dept: FAMILY MEDICINE | Facility: CLINIC | Age: 68
End: 2018-06-21

## 2018-06-21 NOTE — TELEPHONE ENCOUNTER
Xray result says: Radiology did not see any other findings than the mild arthritis we discussed in the office.  I can recommend ice, ibuprofen & modifying activity, but I am unsure about the brace.  Jacey Cline RN

## 2018-06-22 NOTE — TELEPHONE ENCOUNTER
I am confident your symptoms are related to either arthritis or some mild cartilage damage like a meniscal tear.  Arthritis is the same thing as cartilage wear and tear.  You can treat with with as needed icing and otc medication.  Some people find a slip on neoprene sleeve helpful as well.  If you have more feelings of instability, locking or catching in the knee, I would refer to orthopedics.  I don't feel an MRI would be needed at this time.    Jose Delarosa MD

## 2018-07-09 PROCEDURE — 82274 ASSAY TEST FOR BLOOD FECAL: CPT | Performed by: FAMILY MEDICINE

## 2018-07-10 DIAGNOSIS — Z12.5 SPECIAL SCREENING FOR MALIGNANT NEOPLASM OF PROSTATE: ICD-10-CM

## 2018-07-10 DIAGNOSIS — E78.5 HYPERLIPIDEMIA WITH TARGET LDL LESS THAN 130: ICD-10-CM

## 2018-07-10 PROCEDURE — 80053 COMPREHEN METABOLIC PANEL: CPT | Performed by: FAMILY MEDICINE

## 2018-07-10 PROCEDURE — 80061 LIPID PANEL: CPT | Performed by: FAMILY MEDICINE

## 2018-07-10 PROCEDURE — 36415 COLL VENOUS BLD VENIPUNCTURE: CPT | Performed by: FAMILY MEDICINE

## 2018-07-10 PROCEDURE — G0103 PSA SCREENING: HCPCS | Performed by: FAMILY MEDICINE

## 2018-07-11 LAB
ALBUMIN SERPL-MCNC: 4.3 G/DL (ref 3.4–5)
ALP SERPL-CCNC: 92 U/L (ref 40–150)
ALT SERPL W P-5'-P-CCNC: 38 U/L (ref 0–70)
ANION GAP SERPL CALCULATED.3IONS-SCNC: 10 MMOL/L (ref 3–14)
AST SERPL W P-5'-P-CCNC: 15 U/L (ref 0–45)
BILIRUB SERPL-MCNC: 0.8 MG/DL (ref 0.2–1.3)
BUN SERPL-MCNC: 15 MG/DL (ref 7–30)
CALCIUM SERPL-MCNC: 8.9 MG/DL (ref 8.5–10.1)
CHLORIDE SERPL-SCNC: 105 MMOL/L (ref 94–109)
CHOLEST SERPL-MCNC: 205 MG/DL
CO2 SERPL-SCNC: 24 MMOL/L (ref 20–32)
CREAT SERPL-MCNC: 0.97 MG/DL (ref 0.66–1.25)
GFR SERPL CREATININE-BSD FRML MDRD: 77 ML/MIN/1.7M2
GLUCOSE SERPL-MCNC: 91 MG/DL (ref 70–99)
HDLC SERPL-MCNC: 50 MG/DL
LDLC SERPL CALC-MCNC: 134 MG/DL
NONHDLC SERPL-MCNC: 155 MG/DL
POTASSIUM SERPL-SCNC: 4.4 MMOL/L (ref 3.4–5.3)
PROT SERPL-MCNC: 7.3 G/DL (ref 6.8–8.8)
PSA SERPL-ACNC: 2.36 UG/L (ref 0–4)
SODIUM SERPL-SCNC: 139 MMOL/L (ref 133–144)
TRIGL SERPL-MCNC: 106 MG/DL

## 2018-07-12 ENCOUNTER — DOCUMENTATION ONLY (OUTPATIENT)
Dept: VASCULAR SURGERY | Facility: CLINIC | Age: 68
End: 2018-07-12

## 2018-07-13 ENCOUNTER — MYC MEDICAL ADVICE (OUTPATIENT)
Dept: FAMILY MEDICINE | Facility: CLINIC | Age: 68
End: 2018-07-13

## 2018-07-13 DIAGNOSIS — Z12.11 SCREEN FOR COLON CANCER: ICD-10-CM

## 2018-07-13 LAB — HEMOCCULT STL QL IA: NEGATIVE

## 2018-07-13 RX ORDER — SODIUM PHOSPHATE,MONO-DIBASIC 19G-7G/118
3 ENEMA (ML) RECTAL DAILY
Refills: 0 | COMMUNITY
Start: 2018-07-13 | End: 2021-08-02

## 2018-07-13 RX ORDER — MULTIVIT,CALC,MINS/IRON/FOLIC 9MG-400MCG
3000 TABLET ORAL DAILY
COMMUNITY
Start: 2018-07-13 | End: 2023-01-10

## 2019-06-30 ASSESSMENT — ENCOUNTER SYMPTOMS
HEMATURIA: 0
PARESTHESIAS: 0
EYE PAIN: 0
COUGH: 0
HEMATOCHEZIA: 0
ABDOMINAL PAIN: 0
SHORTNESS OF BREATH: 0
MYALGIAS: 0
CHILLS: 0
NERVOUS/ANXIOUS: 0
CONSTIPATION: 0
WEAKNESS: 0
JOINT SWELLING: 0
PALPITATIONS: 0
DIARRHEA: 0
FEVER: 0
HEARTBURN: 0
ARTHRALGIAS: 1
SORE THROAT: 0
FREQUENCY: 1
DIZZINESS: 0
DYSURIA: 0

## 2019-06-30 ASSESSMENT — ACTIVITIES OF DAILY LIVING (ADL): CURRENT_FUNCTION: NO ASSISTANCE NEEDED

## 2019-07-01 ENCOUNTER — OFFICE VISIT (OUTPATIENT)
Dept: FAMILY MEDICINE | Facility: CLINIC | Age: 69
End: 2019-07-01
Payer: MEDICARE

## 2019-07-01 VITALS
HEIGHT: 69 IN | OXYGEN SATURATION: 99 % | SYSTOLIC BLOOD PRESSURE: 134 MMHG | WEIGHT: 194 LBS | DIASTOLIC BLOOD PRESSURE: 74 MMHG | HEART RATE: 59 BPM | TEMPERATURE: 97.9 F | BODY MASS INDEX: 28.73 KG/M2

## 2019-07-01 DIAGNOSIS — Z13.1 SCREENING FOR DIABETES MELLITUS: ICD-10-CM

## 2019-07-01 DIAGNOSIS — R03.0 ELEVATED BLOOD-PRESSURE READING WITHOUT DIAGNOSIS OF HYPERTENSION: ICD-10-CM

## 2019-07-01 DIAGNOSIS — Z12.11 SPECIAL SCREENING FOR MALIGNANT NEOPLASMS, COLON: ICD-10-CM

## 2019-07-01 DIAGNOSIS — Z12.5 SCREENING FOR PROSTATE CANCER: ICD-10-CM

## 2019-07-01 DIAGNOSIS — Z00.00 ENCOUNTER FOR MEDICARE ANNUAL WELLNESS EXAM: Primary | ICD-10-CM

## 2019-07-01 DIAGNOSIS — R35.0 URINARY FREQUENCY: ICD-10-CM

## 2019-07-01 DIAGNOSIS — E78.5 HYPERLIPIDEMIA WITH TARGET LDL LESS THAN 130: ICD-10-CM

## 2019-07-01 LAB
ALBUMIN UR-MCNC: NEGATIVE MG/DL
APPEARANCE UR: CLEAR
BILIRUB UR QL STRIP: NEGATIVE
COLOR UR AUTO: YELLOW
GLUCOSE UR STRIP-MCNC: NEGATIVE MG/DL
HGB UR QL STRIP: NEGATIVE
KETONES UR STRIP-MCNC: NEGATIVE MG/DL
LEUKOCYTE ESTERASE UR QL STRIP: NEGATIVE
NITRATE UR QL: NEGATIVE
PH UR STRIP: 6 PH (ref 5–7)
SOURCE: NORMAL
SP GR UR STRIP: <=1.005 (ref 1–1.03)
UROBILINOGEN UR STRIP-ACNC: 0.2 EU/DL (ref 0.2–1)

## 2019-07-01 PROCEDURE — 81003 URINALYSIS AUTO W/O SCOPE: CPT | Performed by: FAMILY MEDICINE

## 2019-07-01 PROCEDURE — 99213 OFFICE O/P EST LOW 20 MIN: CPT | Mod: 25 | Performed by: FAMILY MEDICINE

## 2019-07-01 PROCEDURE — G0439 PPPS, SUBSEQ VISIT: HCPCS | Performed by: FAMILY MEDICINE

## 2019-07-01 RX ORDER — CHLORAL HYDRATE 500 MG
2 CAPSULE ORAL DAILY
COMMUNITY
End: 2021-08-02

## 2019-07-01 ASSESSMENT — ENCOUNTER SYMPTOMS
WEAKNESS: 0
MYALGIAS: 0
PALPITATIONS: 0
DYSURIA: 0
HEARTBURN: 0
ABDOMINAL PAIN: 0
FREQUENCY: 1
HEMATURIA: 0
HEMATOCHEZIA: 0
ARTHRALGIAS: 1
JOINT SWELLING: 0
SHORTNESS OF BREATH: 0
SORE THROAT: 0
CHILLS: 0
DIARRHEA: 0
COUGH: 0
DIZZINESS: 0
NERVOUS/ANXIOUS: 0
PARESTHESIAS: 0
FEVER: 0
CONSTIPATION: 0
EYE PAIN: 0

## 2019-07-01 ASSESSMENT — ACTIVITIES OF DAILY LIVING (ADL): CURRENT_FUNCTION: NO ASSISTANCE NEEDED

## 2019-07-01 ASSESSMENT — MIFFLIN-ST. JEOR: SCORE: 1640.36

## 2019-07-01 ASSESSMENT — PAIN SCALES - GENERAL: PAINLEVEL: NO PAIN (0)

## 2019-07-01 NOTE — PROGRESS NOTES
"SUBJECTIVE:   Ridge Hutchison is a 69 year old male who presents for Preventive Visit.  Are you in the first 12 months of your Medicare coverage?  No    Healthy Habits:     In general, how would you rate your overall health?  Good    Frequency of exercise:  2-3 days/week    Duration of exercise:  Greater than 60 minutes    Do you usually eat at least 4 servings of fruit and vegetables a day, include whole grains    & fiber and avoid regularly eating high fat or \"junk\" foods?  No    Taking medications regularly:  Yes    Ability to successfully perform activities of daily living:  No assistance needed    Home Safety:  No safety concerns identified    Hearing Impairment:  No hearing concerns    In the past 6 months, have you been bothered by leaking of urine?  No    In general, how would you rate your overall mental or emotional health?  Good      PHQ-2 Total Score: 0    Additional concerns today:  Yes    Do you feel safe in your environment? Yes    Do you have a Health Care Directive? Yes: Patient states has Advance Directive and will bring in a copy to clinic.      Fall risk  Fallen 2 or more times in the past year?: No  Any fall with injury in the past year?: No    Cognitive Screening   1) Repeat 3 items (Leader, Season, Table)    2) Clock draw: NORMAL  3) 3 item recall: Recalls 3 objects  Results: 3 items recalled: COGNITIVE IMPAIRMENT LESS LIKELY    Mini-CogTM Copyright NOEL Buckley. Licensed by the author for use in [facility]; reprinted with permission (keren@.[facility]). All rights reserved.      Do you have sleep apnea, excessive snoring or daytime drowsiness?: Snores but unsure if it is excessive    Reviewed and updated as needed this visit by clinical staff  Tobacco  Allergies  Meds  Problems  Med Hx  Surg Hx  Fam Hx  Soc Hx          Reviewed and updated as needed this visit by Provider  Tobacco  Allergies  Meds  Problems  Med Hx  Surg Hx  Fam Hx  Soc Hx         Social History     Tobacco " Use     Smoking status: Never Smoker     Smokeless tobacco: Never Used   Substance Use Topics     Alcohol use: Yes     Comment: Once every 2 months     If you drink alcohol do you typically have >3 drinks per day or >7 drinks per week? No    No flowsheet data found.        Fasting for labs today.  Taking a green super food supplement.    Heather Desir MA    Patient had a few other concerns today but most of these had to do with what preventive services are covered.  He reports he has had some screening tests such as carotid artery scan and an abdominal aortic aneurysm scan through the life screen and these tests were normal.  He does have some urinary symptoms at this time that include nocturia x2 and increased frequency during the day, particularly after caffeine consumption.  If he does not empty his bladder when he gets a full sensation then he will also have some issues with hesitancy.  Most of the time he feels as if he can empty his bladder completely.  Stream is diminished over time.  He also was concerned today about his blood pressure.  He has noted that it frequently is elevated when he comes to the doctor's office but he has not checked it outside the office on a regular basis.  He continues to exercise on a regular basis.  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  He denies any chest discomfort or anginal symptoms while exercising but he did ask about getting a coronary artery calcium scan at this time.        Current providers sharing in care for this patient include:   Patient Care Team:  Jose Delarosa MD as PCP - General (Family Practice)  Jose Delarosa MD as Assigned PCP    The following health maintenance items are reviewed in Epic and correct as of today:  Health Maintenance   Topic Date Due     ADVANCE CARE PLANNING  1950     ZOSTER IMMUNIZATION (1 of 2) 05/11/2001     EYE EXAM  08/14/2018     FALL RISK ASSESSMENT  06/20/2019      MEDICARE ANNUAL WELLNESS VISIT  2019     FIT  2019     INFLUENZA VACCINE (1) 2019     LIPID  07/10/2023     DTAP/TDAP/TD IMMUNIZATION (3 - Td) 2024     HEPATITIS C SCREENING  Completed     PHQ-2  Completed     AORTIC ANEURYSM SCREENING (SYSTEM ASSIGNED)  Completed     IPV IMMUNIZATION  Aged Out     MENINGITIS IMMUNIZATION  Aged Out     Patient Active Problem List   Diagnosis     Disorder of lipoid metabolism     Hyperlipidemia with target LDL less than 130     Atypical nevus     Past Surgical History:   Procedure Laterality Date     C NONSPECIFIC PROCEDURE      right leg fibroid tumor removal     DENTAL SURGERY      wisdom teeth removal     HC REMOVAL OF TONSILS,<13 Y/O         Social History     Tobacco Use     Smoking status: Never Smoker     Smokeless tobacco: Never Used   Substance Use Topics     Alcohol use: Yes     Comment: Once every 2 months     Family History   Problem Relation Age of Onset     C.A.D. Father      Hypertension Father      Eye Disorder Father         macular degeneration     Heart Failure Father          age 90     Macular Degeneration Father      Genitourinary Problems Mother         bladder falling     Dementia Mother          age 93     Heart Disease Maternal Grandmother          from heart disease,     Cancer Paternal Grandfather         bone     C.A.D. Paternal Grandmother      Arthritis Sister         knee replacements due to arthritis     Hypertension Sister      Multiple Sclerosis Sister      Crohn's Disease Son      Glaucoma No family hx of          Current Outpatient Medications   Medication Sig Dispense Refill     aspirin 325 MG tablet        fish oil-omega-3 fatty acids 1000 MG capsule Take 2 g by mouth daily       Ibuprofen (ADVIL PO) Take 1-2 tablets by mouth as needed for moderate pain       Multiple Vitamin (MULTI VITAMIN MENS PO)        Flaxseed, Linseed, (GNP FLAXSEED) 1000 MG CAPS Take 3,000 mg by mouth daily        "glucosamine-chondroitin (GLUCOSAMINE CHONDR COMPLEX) 500-400 MG CAPS per capsule Take 3 capsules by mouth daily  0     Allergies   Allergen Reactions     No Known Drug Allergies      Pneumonia Vaccine:Adults age 65+ who have not received previous Pneumovax (PPSV23) or PCV13 as an adult: Should first be given PCV13 AND then should be given PPSV23 6-12 months after PCV13    Review of Systems   Constitutional: Negative for chills and fever.   HENT: Negative for congestion, ear pain, hearing loss and sore throat.    Eyes: Negative for pain and visual disturbance.   Respiratory: Negative for cough and shortness of breath.    Cardiovascular: Negative for chest pain, palpitations and peripheral edema.   Gastrointestinal: Negative for abdominal pain, constipation, diarrhea, heartburn and hematochezia.   Genitourinary: Positive for frequency and urgency. Negative for discharge, dysuria, genital sores, hematuria and impotence.   Musculoskeletal: Positive for arthralgias. Negative for joint swelling and myalgias.   Skin: Negative for rash.   Neurological: Negative for dizziness, weakness and paresthesias.   Psychiatric/Behavioral: Negative for mood changes. The patient is not nervous/anxious.          OBJECTIVE:   /74 (BP Location: Right arm, Patient Position: Sitting, Cuff Size: Adult Large)   Pulse 59   Temp 97.9  F (36.6  C) (Oral)   Ht 1.753 m (5' 9\")   Wt 88 kg (194 lb)   SpO2 99%   BMI 28.65 kg/m   Estimated body mass index is 28.65 kg/m  as calculated from the following:    Height as of this encounter: 1.753 m (5' 9\").    Weight as of this encounter: 88 kg (194 lb).  Physical Exam  GENERAL: healthy, alert and no distress  EYES: Eyes grossly normal to inspection, PERRL and conjunctivae and sclerae normal  HENT: ear canals and TM's normal, nose and mouth without ulcers or lesions  NECK: no adenopathy, no asymmetry, masses, or scars and thyroid normal to palpation  RESP: lungs clear to auscultation - no rales, " rhonchi or wheezes  CV: regular rate and rhythm, normal S1 S2, no S3 or S4, no murmur, click or rub, no peripheral edema and peripheral pulses strong  ABDOMEN: soft, nontender, no hepatosplenomegaly, no masses and bowel sounds normal   (male): normal male genitalia without lesions or urethral discharge, no hernia  MS: no gross musculoskeletal defects noted, no edema  SKIN: no suspicious lesions or rashes  NEURO: Normal strength and tone, mentation intact and speech normal  PSYCH: mentation appears normal, affect normal/bright    Diagnostic Test Results:  Labs reviewed in Epic    ASSESSMENT / PLAN:   1. Encounter for Medicare annual wellness exam  Routine health issues appropriate for his age were reviewed at this time.  I did put in a a referral to an eye doctor as he asked about glaucoma screening and he has a family history of macular degeneration.  - EYE EXAM (SIMPLE-NONBILLABLE)  - OPTOMETRY REFERRAL    2. Hyperlipidemia with target LDL less than 130  Lipid profile will be done today.  Based on his ten-year risk he would qualify for statin therapy.  I suggested if he want to do the heart scan he could do that through [facility] radiology and gave him information on how to set that up.  - Lipid panel reflex to direct LDL Fasting; Future    3. Screening for diabetes mellitus  Annual screening for blood sugar was done.  - **Glucose FUTURE 2mo; Future    4. Screening for prostate cancer  After discussion, annual screening for prostate cancer will be done.  The patient is just 1 year short of his last blood work so was advised to come back after July 10 for completion of the blood tests.  - **Prostate spec antigen screen FUTURE anytime; Future    5. Elevated blood-pressure reading without diagnosis of hypertension  Recheck in the office was normal.  Some possibility of whitecoat hypertension.  I suggested he obtain a home blood pressure cuff and monitor with those means and follow-up if they are elevated at  "home.    6. Urinary frequency  Urine analysis will be done today.  Results to patient and follow-up testing if needed.  - *UA reflex to Microscopic and Culture ([place] [facility] [place] [facility] (except [place] and [place])    7. Special screening for malignant neoplasms, colon  Options were discussed with the patient and he would like to do Cologuard.  Form will be completed and faxed to the company for that.      End of Life Planning:  Patient currently has an advanced directive: Yes.  Practitioner is supportive of decision.    COUNSELING:  Reviewed preventive health counseling, as reflected in patient instructions       Consider AAA screening for ages 65-75 and smoking history       Regular exercise       Healthy diet/nutrition       Vision screening       Hearing screening       Dental care       Bladder control       Colon cancer screening       Prostate cancer screening    Estimated body mass index is 28.65 kg/m  as calculated from the following:    Height as of this encounter: 1.753 m (5' 9\").    Weight as of this encounter: 88 kg (194 lb).         reports that he has never smoked. He has never used smokeless tobacco.      Appropriate preventive services were discussed with this patient, including applicable screening as appropriate for cardiovascular disease, diabetes, osteopenia/osteoporosis, and glaucoma.  As appropriate for age/gender, discussed screening for colorectal cancer, prostate cancer, breast cancer, and cervical cancer. Checklist reviewing preventive services available has been given to the patient.    Reviewed patients plan of care and provided an AVS. The Basic Care Plan (routine screening as documented in Health Maintenance) for Ridge meets the Care Plan requirement. This Care Plan has been established and reviewed with the Patient.    Counseling Resources:  ATP IV Guidelines  Pooled Cohorts Equation Calculator  Breast Cancer Risk Calculator  FRAX Risk Assessment  ICSI Preventive " Guidelines  Dietary Guidelines for Americans, 2010  USDA's MyPlate  ASA Prophylaxis  Lung CA Screening    Jose Delarosa MD  [facility]    Identified Health Risks:

## 2019-07-02 ENCOUNTER — TRANSFERRED RECORDS (OUTPATIENT)
Dept: HEALTH INFORMATION MANAGEMENT | Facility: CLINIC | Age: 69
End: 2019-07-02

## 2019-07-16 DIAGNOSIS — Z12.5 SCREENING FOR PROSTATE CANCER: ICD-10-CM

## 2019-07-16 DIAGNOSIS — Z13.1 SCREENING FOR DIABETES MELLITUS: ICD-10-CM

## 2019-07-16 DIAGNOSIS — E78.5 HYPERLIPIDEMIA WITH TARGET LDL LESS THAN 130: ICD-10-CM

## 2019-07-16 LAB
CHOLEST SERPL-MCNC: 201 MG/DL
GLUCOSE SERPL-MCNC: 93 MG/DL (ref 70–99)
HDLC SERPL-MCNC: 50 MG/DL
LDLC SERPL CALC-MCNC: 134 MG/DL
NONHDLC SERPL-MCNC: 151 MG/DL
PSA SERPL-ACNC: 2.67 UG/L (ref 0–4)
TRIGL SERPL-MCNC: 86 MG/DL

## 2019-07-16 PROCEDURE — 82947 ASSAY GLUCOSE BLOOD QUANT: CPT | Performed by: FAMILY MEDICINE

## 2019-07-16 PROCEDURE — G0103 PSA SCREENING: HCPCS | Performed by: FAMILY MEDICINE

## 2019-07-16 PROCEDURE — 36415 COLL VENOUS BLD VENIPUNCTURE: CPT | Performed by: FAMILY MEDICINE

## 2019-07-16 PROCEDURE — 80061 LIPID PANEL: CPT | Performed by: FAMILY MEDICINE

## 2019-07-18 ENCOUNTER — MYC MEDICAL ADVICE (OUTPATIENT)
Dept: FAMILY MEDICINE | Facility: CLINIC | Age: 69
End: 2019-07-18

## 2019-07-25 ENCOUNTER — TRANSFERRED RECORDS (OUTPATIENT)
Dept: HEALTH INFORMATION MANAGEMENT | Facility: CLINIC | Age: 69
End: 2019-07-25

## 2019-07-25 LAB — COLOGUARD-ABSTRACT: NEGATIVE

## 2019-07-28 ENCOUNTER — MYC MEDICAL ADVICE (OUTPATIENT)
Dept: FAMILY MEDICINE | Facility: CLINIC | Age: 69
End: 2019-07-28

## 2019-07-29 ENCOUNTER — MYC MEDICAL ADVICE (OUTPATIENT)
Dept: FAMILY MEDICINE | Facility: CLINIC | Age: 69
End: 2019-07-29

## 2019-07-31 ENCOUNTER — TELEPHONE (OUTPATIENT)
Dept: FAMILY MEDICINE | Facility: CLINIC | Age: 69
End: 2019-07-31

## 2019-07-31 NOTE — TELEPHONE ENCOUNTER
Reason for Call:  Other Health Care Directive     Detailed comments: Patient dropped off a copy of his Health Care Directive for Dr. Delarosa.  Was put in his box up by .    Phone Number Patient can be reached at: Home number on file [phone] (home)    Best Time: anytime    Can we leave a detailed message on this number? YES    Call taken on 7/31/2019 at 1:33 PM by Catalina Moreau

## 2019-08-07 ENCOUNTER — DOCUMENTATION ONLY (OUTPATIENT)
Dept: OTHER | Facility: CLINIC | Age: 69
End: 2019-08-07

## 2019-08-07 ENCOUNTER — MYC MEDICAL ADVICE (OUTPATIENT)
Dept: FAMILY MEDICINE | Facility: CLINIC | Age: 69
End: 2019-08-07

## 2019-10-04 ENCOUNTER — HEALTH MAINTENANCE LETTER (OUTPATIENT)
Age: 69
End: 2019-10-04

## 2020-06-11 ENCOUNTER — MYC MEDICAL ADVICE (OUTPATIENT)
Dept: FAMILY MEDICINE | Facility: CLINIC | Age: 70
End: 2020-06-11

## 2020-08-03 ENCOUNTER — OFFICE VISIT (OUTPATIENT)
Dept: FAMILY MEDICINE | Facility: CLINIC | Age: 70
End: 2020-08-03
Payer: COMMERCIAL

## 2020-08-03 ENCOUNTER — DOCUMENTATION ONLY (OUTPATIENT)
Dept: LAB | Facility: CLINIC | Age: 70
End: 2020-08-03

## 2020-08-03 VITALS
WEIGHT: 194 LBS | DIASTOLIC BLOOD PRESSURE: 92 MMHG | TEMPERATURE: 98.1 F | BODY MASS INDEX: 27.77 KG/M2 | OXYGEN SATURATION: 95 % | HEIGHT: 70 IN | HEART RATE: 69 BPM | SYSTOLIC BLOOD PRESSURE: 165 MMHG

## 2020-08-03 DIAGNOSIS — E78.5 HYPERLIPIDEMIA WITH TARGET LDL LESS THAN 130: ICD-10-CM

## 2020-08-03 DIAGNOSIS — Z12.5 SCREENING FOR PROSTATE CANCER: ICD-10-CM

## 2020-08-03 DIAGNOSIS — Z13.1 SCREENING FOR DIABETES MELLITUS: ICD-10-CM

## 2020-08-03 DIAGNOSIS — R03.0 ELEVATED BLOOD PRESSURE READING WITHOUT DIAGNOSIS OF HYPERTENSION: ICD-10-CM

## 2020-08-03 DIAGNOSIS — Z23 NEED FOR VACCINATION FOR STREP PNEUMONIAE: ICD-10-CM

## 2020-08-03 DIAGNOSIS — Z00.00 ENCOUNTER FOR MEDICARE ANNUAL WELLNESS EXAM: Primary | ICD-10-CM

## 2020-08-03 LAB
CHOLEST SERPL-MCNC: 195 MG/DL
GLUCOSE SERPL-MCNC: 86 MG/DL (ref 70–99)
HDLC SERPL-MCNC: 55 MG/DL
LDLC SERPL CALC-MCNC: 127 MG/DL
NONHDLC SERPL-MCNC: 140 MG/DL
TRIGL SERPL-MCNC: 64 MG/DL

## 2020-08-03 PROCEDURE — 36415 COLL VENOUS BLD VENIPUNCTURE: CPT | Performed by: FAMILY MEDICINE

## 2020-08-03 PROCEDURE — 82947 ASSAY GLUCOSE BLOOD QUANT: CPT | Performed by: FAMILY MEDICINE

## 2020-08-03 PROCEDURE — 99397 PER PM REEVAL EST PAT 65+ YR: CPT | Mod: 25 | Performed by: FAMILY MEDICINE

## 2020-08-03 PROCEDURE — 99212 OFFICE O/P EST SF 10 MIN: CPT | Mod: 25 | Performed by: FAMILY MEDICINE

## 2020-08-03 PROCEDURE — 80061 LIPID PANEL: CPT | Performed by: FAMILY MEDICINE

## 2020-08-03 PROCEDURE — 90732 PPSV23 VACC 2 YRS+ SUBQ/IM: CPT | Performed by: FAMILY MEDICINE

## 2020-08-03 PROCEDURE — G0103 PSA SCREENING: HCPCS | Performed by: FAMILY MEDICINE

## 2020-08-03 PROCEDURE — G0009 ADMIN PNEUMOCOCCAL VACCINE: HCPCS | Performed by: FAMILY MEDICINE

## 2020-08-03 ASSESSMENT — PAIN SCALES - GENERAL: PAINLEVEL: NO PAIN (0)

## 2020-08-03 ASSESSMENT — MIFFLIN-ST. JEOR: SCORE: 1646.23

## 2020-08-03 NOTE — PROGRESS NOTES
"  SUBJECTIVE:   Ridge Hutchison is a 70 year old male who presents for Preventive Visit.    Are you in the first 12 months of your Medicare Part B coverage?  Unsure exactly when he got it.    Physical Health:    In general, how would you rate your overall physical health? good    Outside of work, how many days during the week do you exercise? 4-5 days/week    Outside of work, approximately how many minutes a day do you exercise?30-45 minutes    If you drink alcohol do you typically have >3 drinks per day or >7 drinks per week? No    Do you usually eat at least 4 servings of fruit and vegetables a day, include whole grains & fiber and avoid regularly eating high fat or \"junk\" foods? NO    Do you have any problems taking medications regularly?  No    Do you have any side effects from medications? not applicable    Needs assistance for the following daily activities: no assistance needed    Which of the following safety concerns are present in your home?  none identified     Hearing impairment: No    In the past 6 months, have you been bothered by leaking of urine? no    Mental Healt    In general, how would you rate your overall mental or emotional health? good  PHQ-2 Score:  0    Do you feel safe in your environment? Yes    Have you ever done Advance Care Planning? (For example, a Health Directive, POLST, or a discussion with a medical provider or your loved ones about your wishes): Yes, patient states has an Advance Care Planning document and will bring a copy to the clinic.    Additional concerns to address?  No    Fall risk:  Fallen 2 or more times in the past year?: No  Any fall with injury in the past year?: No    Cognitive Screenin) Repeat 3 items (Leader, Season, Table)    2) Clock draw: NORMAL  3) 3 item recall: Recalls 2 objects   Results: NORMAL clock, 1-2 items recalled: COGNITIVE IMPAIRMENT LESS LIKELY    Mini-CogTM Copyright S [employer]. Licensed by the author for use in [facility]; " reprinted with permission ([email or web address]). All rights reserved.      Do you have sleep apnea, excessive snoring or daytime drowsiness?: Thinks he snores, unsure if he has apnea-lives alone.    Fasting.  Heather Desir MA    Patient is fasting today for blood work.  He noted an elevated blood pressure and says it is frequently elevated when he comes to the doctor.  He does not check it regularly outside the office.  He is also noticed recently some occasional right-sided pain radiating from the groin down into the testicle.  He is not sexually active and is not had a vasectomy.  Pain is intermittent and does not bother him consistently enough that he takes any medication for it.  He is not noticed any mass or lump in the area.        Reviewed and updated as needed this visit by clinical staff  Tobacco  Allergies  Meds  Problems  Med Hx  Surg Hx  Fam Hx  Soc Hx          Reviewed and updated as needed this visit by Provider  Tobacco  Allergies  Meds  Problems  Med Hx  Surg Hx  Fam Hx  Soc Hx         Social History     Tobacco Use     Smoking status: Never Smoker     Smokeless tobacco: Never Used   Substance Use Topics     Alcohol use: Yes     Comment: Once every 2 months                           Current providers sharing in care for this patient include:   Patient Care Team:  Jose Delarosa MD as PCP - General (Family Practice)  Jose Delarosa MD as Assigned PCP    The following health maintenance items are reviewed in Epic and correct as of today:  Health Maintenance   Topic Date Due     ZOSTER IMMUNIZATION (1 of 2) 05/11/2001     PNEUMOCOCCAL IMMUNIZATION 65+ LOW/MEDIUM RISK (2 of 2 - PPSV23) 06/28/2018     EYE EXAM  08/14/2018     FALL RISK ASSESSMENT  07/01/2020     INFLUENZA VACCINE (1) 09/01/2020     MEDICARE ANNUAL WELLNESS VISIT  08/03/2021     COLORECTAL CANCER SCREENING  08/06/2022     DTAP/TDAP/TD IMMUNIZATION (3 - Td) 07/09/2024     LIPID  07/16/2024     ADVANCE CARE  PLANNING  2025     HEPATITIS C SCREENING  Completed     PHQ-2  Completed     AORTIC ANEURYSM SCREENING (SYSTEM ASSIGNED)  Completed     IPV IMMUNIZATION  Aged Out     MENINGITIS IMMUNIZATION  Aged Out     Patient Active Problem List   Diagnosis     Disorder of lipoid metabolism     Hyperlipidemia with target LDL less than 130     Atypical nevus     Past Surgical History:   Procedure Laterality Date     C NONSPECIFIC PROCEDURE      right leg fibroid tumor removal     DENTAL SURGERY      wisdom teeth removal     HC REMOVAL OF TONSILS,<11 Y/O         Social History     Tobacco Use     Smoking status: Never Smoker     Smokeless tobacco: Never Used   Substance Use Topics     Alcohol use: Yes     Comment: Once every 2 months     Family History   Problem Relation Age of Onset     C.A.D. Father      Hypertension Father      Eye Disorder Father         macular degeneration     Heart Failure Father          age 90     Macular Degeneration Father      Genitourinary Problems Mother         bladder falling     Dementia Mother          age 93     Heart Disease Maternal Grandmother          from heart disease,     Cancer Paternal Grandfather         bone     C.A.D. Paternal Grandmother      Arthritis Sister         knee replacements due to arthritis     Hypertension Sister      Glaucoma Sister      Multiple Sclerosis Sister      Crohn's Disease Son          Current Outpatient Medications   Medication Sig Dispense Refill     aspirin 325 MG tablet        fish oil-omega-3 fatty acids 1000 MG capsule Take 2 g by mouth daily       Ibuprofen (ADVIL PO) Take 1-2 tablets by mouth as needed for moderate pain       Multiple Vitamin (MULTI VITAMIN MENS PO)        UNABLE TO FIND MEDICATION NAME: Powder organic proteins.       Flaxseed, Linseed, (GNP FLAXSEED) 1000 MG CAPS Take 3,000 mg by mouth daily       glucosamine-chondroitin (GLUCOSAMINE CHONDR COMPLEX) 500-400 MG CAPS per capsule Take 3 capsules by mouth daily  0  "    Allergies   Allergen Reactions     No Known Drug Allergies      Pneumonia Vaccine:Adults age 65+ who have not received previous Pneumovax (PPSV23) or PCV13 as an adult: Should first be given PCV13 AND then should be given PPSV23 6-12 months after PCV13    ROS:  Constitutional, HEENT, cardiovascular, pulmonary, GI, , musculoskeletal, neuro, skin, endocrine and psych systems are negative, except as otherwise noted.    OBJECTIVE:   BP (!) 165/92 (BP Location: Right arm, Patient Position: Sitting, Cuff Size: Adult Large)   Pulse 69   Temp 98.1  F (36.7  C) (Oral)   Ht 1.77 m (5' 9.69\")   Wt 88 kg (194 lb)   SpO2 95%   BMI 28.09 kg/m   Estimated body mass index is 28.09 kg/m  as calculated from the following:    Height as of this encounter: 1.77 m (5' 9.69\").    Weight as of this encounter: 88 kg (194 lb).  EXAM:   GENERAL: healthy, alert and no distress  EYES: Eyes grossly normal to inspection, PERRL and conjunctivae and sclerae normal  HENT: ear canals and TM's normal, nose and mouth without ulcers or lesions  NECK: no adenopathy, no asymmetry, masses, or scars and thyroid normal to palpation  RESP: lungs clear to auscultation - no rales, rhonchi or wheezes  CV: regular rate and rhythm, normal S1 S2, no S3 or S4, no murmur, click or rub, no peripheral edema and peripheral pulses strong  ABDOMEN: soft, nontender, no hepatosplenomegaly, no masses and bowel sounds normal   (male): normal male genitalia without lesions or urethral discharge, no hernia   (male): testicles normal without atrophy or masses, no hernias, penis normal without urethral discharge and no significant tenderness or abnormality was noted to the cord structures.  Testicles are somewhat atrophic.  MS: no gross musculoskeletal defects noted, no edema  MS: Hip range of motion is limited by some muscular tightness.  Shaggy is negative bilaterally.  SKIN: no suspicious lesions or rashes  NEURO: Normal strength and tone, mentation intact and " "speech normal  PSYCH: mentation appears normal, affect normal/bright    Diagnostic Test Results:  Labs reviewed in Epic    ASSESSMENT / PLAN:   1. Encounter for Medicare annual wellness exam  Routine wellness issues were reviewed.  Follow-up is recommended in 1 year.    2. Screening for prostate cancer  Screening for prostate cancer was discussed and done today.  - PSA, screen    3. Screening for diabetes mellitus  Screening for diabetes was discussed and done today.  - Glucose    4. Hyperlipidemia with target LDL less than 130  Lipid panel will be rechecked today.  Address results as needed.  - Lipid panel reflex to direct LDL Fasting    5. Need for vaccination for Strep pneumoniae  Pneumococcal vaccination was provided today.  Flu shot was recommended in the fall.  - Pneumococcal vaccine 23 valent PPSV23  (Pneumovax) [32591]  - ADMIN MEDICARE: Pneumococcal Vaccine ()    6. Elevated blood pressure reading without diagnosis of hypertension   Patient is interested in checking his blood pressures at home and so I recommended that.  We talked about the possibility of whitecoat hypertension versus developing hypertension.  If values remain consistently elevated at home, follow-up is recommended before 1 year for recheck and discussion of options.      COUNSELING:  Reviewed preventive health counseling, as reflected in patient instructions       Regular exercise       Healthy diet/nutrition       Vision screening       Hearing screening       Dental care       Bladder control       Fall risk prevention       Immunizations    Vaccinated for: Pneumococcal             Colon cancer screening       Prostate cancer screening    Estimated body mass index is 28.09 kg/m  as calculated from the following:    Height as of this encounter: 1.77 m (5' 9.69\").    Weight as of this encounter: 88 kg (194 lb).         reports that he has never smoked. He has never used smokeless tobacco.      Appropriate preventive services were " discussed with this patient, including applicable screening as appropriate for cardiovascular disease, diabetes, osteopenia/osteoporosis, and glaucoma.  As appropriate for age/gender, discussed screening for colorectal cancer, prostate cancer, breast cancer, and cervical cancer. Checklist reviewing preventive services available has been given to the patient.    Reviewed patients plan of care and provided an AVS. The Basic Care Plan (routine screening as documented in Health Maintenance) for Ridge meets the Care Plan requirement. This Care Plan has been established and reviewed with the Patient.    Counseling Resources:  ATP IV Guidelines  Pooled Cohorts Equation Calculator  Breast Cancer Risk Calculator  FRAX Risk Assessment  ICSI Preventive Guidelines  Dietary Guidelines for Americans, 2010  USDA's MyPlate  ASA Prophylaxis  Lung CA Screening    Jose Delarosa MD  [facility]

## 2020-08-03 NOTE — PROGRESS NOTES
Patient came into lab today (8-3) and would like to know his blood type.   Lab can add this onto the previous draw if ordered.   Please order if appropriate so that lab may process.     Thanks, Erika MITCHELL

## 2020-08-03 NOTE — PATIENT INSTRUCTIONS
Patient Education   Personalized Prevention Plan  You are due for the preventive services outlined below.  Your care team is available to assist you in scheduling these services.  If you have already completed any of these items, please share that information with your care team to update in your medical record.  Health Maintenance Due   Topic Date Due     Zoster (Shingles) Vaccine (1 of 2) 05/11/2001     Pneumococcal Vaccine (2 of 2 - PPSV23) 06/28/2018     Eye Exam  08/14/2018     FALL RISK ASSESSMENT  07/01/2020      buy a blood pressure cuff by [employer] or [employer]

## 2020-08-03 NOTE — NURSING NOTE
Prior to immunization administration, verified patients identity using patient s name and date of birth. Please see Immunization Activity for additional information.     Screening Questionnaire for Adult Immunization    Are you sick today?   No   Do you have allergies to medications, food, a vaccine component or latex?   No   Have you ever had a serious reaction after receiving a vaccination?   No   Do you have a long-term health problem with heart, lung, kidney, or metabolic disease (e.g., diabetes), asthma, a blood disorder, no spleen, complement component deficiency, a cochlear implant, or a spinal fluid leak?  Are you on long-term aspirin therapy?   No   Do you have cancer, leukemia, HIV/AIDS, or any other immune system problem?   No   Do you have a parent, brother, or sister with an immune system problem?   No   In the past 3 months, have you taken medications that affect  your immune system, such as prednisone, other steroids, or anticancer drugs; drugs for the treatment of rheumatoid arthritis, Crohn s disease, or psoriasis; or have you had radiation treatments?   No   Have you had a seizure, or a brain or other nervous system problem?   No   During the past year, have you received a transfusion of blood or blood    products, or been given immune (gamma) globulin or antiviral drug?   No   For women: Are you pregnant or is there a chance you could become       pregnant during the next month?   No   Have you received any vaccinations in the past 4 weeks?   No     Immunization questionnaire answers were all negative.        Per orders of Dr. Delarosa, injection of PCV23 given by Heather Desir. Patient instructed to remain in clinic for 15 minutes afterwards, and to report any adverse reaction to me immediately.    Vaccine information supplied.       Screening performed by Heather Desir on 8/3/2020 at 2:05 PM.

## 2020-08-04 ENCOUNTER — MYC MEDICAL ADVICE (OUTPATIENT)
Dept: FAMILY MEDICINE | Facility: CLINIC | Age: 70
End: 2020-08-04

## 2020-08-04 LAB — PSA SERPL-ACNC: 2.28 UG/L (ref 0–4)

## 2020-08-04 NOTE — TELEPHONE ENCOUNTER
[facility] message route to PCP as FYI, no further action needed.     Petra Ortega, RN, BSN, PHN  [facility]: [place]

## 2020-08-04 NOTE — TELEPHONE ENCOUNTER
Routed this 4th [employer] message sent today to provider to address.    Chhaya Pena RN  [facility]

## 2020-08-05 ENCOUNTER — MYC MEDICAL ADVICE (OUTPATIENT)
Dept: FAMILY MEDICINE | Facility: CLINIC | Age: 70
End: 2020-08-05

## 2020-09-15 NOTE — PROGRESS NOTES
SUBJECTIVE:   Ridge Hutchison is a 68 year old male who presents for Preventive Visit.    Are you in the first 12 months of your Medicare coverage?  No    Physical   Annual:     Getting at least 3 servings of Calcium per day::  Yes    Bi-annual eye exam::  Yes    Dental care twice a year::  Yes    Sleep apnea or symptoms of sleep apnea::  None    Diet::  Regular (no restrictions)    Frequency of exercise::  1 day/week    Duration of exercise::  Less than 15 minutes    Taking medications regularly::  Yes    Medication side effects::  Not applicable    Additional concerns today::  YES    Ability to successfully perform activities of daily living: no assistance needed  Home Safety:  No safety concerns identified  Hearing Impairment: no hearing concerns        Fall risk:  Fallen 2 or more times in the past year?: No  Any fall with injury in the past year?: No      COGNITIVE SCREEN  1) Repeat 3 items (Banana, Sunrise, Chair)    2) Clock draw: NORMAL  3) 3 item recall: Recalls 3 objects  Results: 3 items recalled: COGNITIVE IMPAIRMENT LESS LIKELY    Mini-CogTM Copyright NOEL Buckley. Licensed by the author for use in [facility]; reprinted with permission (keren@[facility]). All rights reserved.        Reviewed and updated as needed this visit by clinical staff  Tobacco  Allergies  Meds  Med Hx  Surg Hx  Fam Hx  Soc Hx        Reviewed and updated as needed this visit by Provider        Social History   Substance Use Topics     Smoking status: Never Smoker     Smokeless tobacco: Never Used     Alcohol use Yes      Comment: Once every 2 months       Alcohol Use 6/20/2018   If you drink alcohol do you typically have greater than 3 drinks per day OR greater than 7 drinks per week? No           Musculoskeletal problem/pain      Duration:  Since January    Description  Location: Right Knee    Intensity:  mild, moderate    Accompanying signs and symptoms: none    History  Previous similar problem: no   Previous  evaluation:  none    Precipitating or alleviating factors:  Trauma or overuse: YES-  Running  Aggravating factors include:  Playing tennis-- twisting    Therapies tried and outcome: rest/inactivity, heat, ice and acetaminophen      Today's PHQ-2 Score:   PHQ-2 ( 1999 Pf[employer]) 6/20/2018   Q1: Little interest or pleasure in doing things 0   Q2: Feeling down, depressed or hopeless 0   PHQ-2 Score 0   Q1: Little interest or pleasure in doing things Not at all   Q2: Feeling down, depressed or hopeless Not at all   PHQ-2 Score 0       Do you feel safe in your environment - Yes    Do you have a Health Care Directive?: Yes: Patient states has Advance Directive and will bring in a copy to clinic.    Current providers sharing in care for this patient include:   Patient Care Team:  Radha Gonsalez DO as PCP - General (Family Practice)    The following health maintenance items are reviewed in Epic and correct as of today:  Health Maintenance   Topic Date Due     PHQ-2 Q1 YR  05/11/1962     ADVANCE DIRECTIVE PLANNING Q5 YRS  05/11/2006     AORTIC ANEURYSM SCREENING (SYSTEM ASSIGNED)  05/11/2016     FALL RISK ASSESSMENT  06/28/2018     PNEUMOCOCCAL (2 of 2 - PPSV23) 06/28/2018     FIT Q1 YR  07/05/2018     EYE EXAM Q1 YEAR  08/14/2018     INFLUENZA VACCINE (Season Ended) 09/01/2018     COLONOSCOPY Q10 YR  07/20/2021     LIPID SCREEN Q5 YR MALE (SYSTEM ASSIGNED)  06/28/2022     TETANUS Q10 YR  07/09/2024     HEPATITIS C SCREENING  Completed     BP Readings from Last 3 Encounters:   06/20/18 128/76   06/28/17 122/86   07/09/14 104/60    Wt Readings from Last 3 Encounters:   06/20/18 189 lb (85.7 kg)   06/28/17 190 lb (86.2 kg)   07/09/14 190 lb (86.2 kg)                  Patient Active Problem List   Diagnosis     Disorder of lipoid metabolism     Hyperlipidemia with target LDL less than 130     Atypical nevus     Past Surgical History:   Procedure Laterality Date     C NONSPECIFIC PROCEDURE  1995    right leg fibroid tumor removal  "    DENTAL SURGERY      wisdom teeth removal     HC REMOVAL OF TONSILS,<13 Y/O         Social History   Substance Use Topics     Smoking status: Never Smoker     Smokeless tobacco: Never Used     Alcohol use Yes      Comment: Once every 2 months     Family History   Problem Relation Age of Onset     C.A.D. Father      Hypertension Father      Eye Disorder Father      macular degeneration     Heart Failure Father       age 90     Macular Degeneration Father      Genitourinary Problems Mother      bladder falling     Dementia Mother       age 93     HEART DISEASE Maternal Grandmother       from heart disease,     Cancer Paternal Grandfather      bone     C.A.D. Paternal Grandmother      Arthritis Sister      knee replacements due to arthritis     Multiple Sclerosis Sister      Glaucoma No family hx of          Current Outpatient Prescriptions   Medication Sig Dispense Refill     aspirin 325 MG tablet        Ibuprofen (ADVIL PO) Take 1-2 tablets by mouth as needed for moderate pain       Allergies   Allergen Reactions     No Known Drug Allergies        Pneumonia Vaccine:Adults age 65+ who have not received previous Pneumovax (PPSV23) or PCV13 as an adult: Should first be given PCV13 AND then should be given PPSV23 6-12 months after PCV13    Review of Systems  Constitutional, HEENT, cardiovascular, pulmonary, GI, , musculoskeletal, neuro, skin, endocrine and psych systems are negative, except as otherwise noted.    OBJECTIVE:   /76 (BP Location: Right arm, Patient Position: Sitting, Cuff Size: Adult Large)  Pulse 76  Temp 98.6  F (37  C) (Oral)  Ht 5' 8.75\" (1.746 m)  Wt 189 lb (85.7 kg)  BMI 28.11 kg/m2 Estimated body mass index is 28.11 kg/(m^2) as calculated from the following:    Height as of this encounter: 5' 8.75\" (1.746 m).    Weight as of this encounter: 189 lb (85.7 kg).  Physical Exam  GENERAL: healthy, alert and no distress  EYES: Eyes grossly normal to inspection, PERRL and " conjunctivae and sclerae normal  HENT: ear canals and TM's normal, nose and mouth without ulcers or lesions  NECK: no adenopathy, no asymmetry, masses, or scars and thyroid normal to palpation  RESP: lungs clear to auscultation - no rales, rhonchi or wheezes  CV: regular rate and rhythm, normal S1 S2, no S3 or S4, no murmur, click or rub, no peripheral edema and peripheral pulses strong  ABDOMEN: soft, nontender, no hepatosplenomegaly, no masses and bowel sounds normal  MS: no gross musculoskeletal defects noted, no edema  MS: Knees are normal in appearance bilaterally, possible slight effusion right knee.  Patient has a difficult time relaxing for the examination of the right knee but range of motion appears normal.  There is no focal tenderness to palpation over the joint line or patella.  The knee is stable to varus and valgus stress.  Anterior and posterior drawer is normal.  SKIN: no suspicious lesions or rashes  NEURO: Normal strength and tone, mentation intact and speech normal  PSYCH: mentation appears normal, affect normal/bright    X-ray of the right knee was done in clinic today.  Possible small effusion.  Minimal medial joint space narrowing and spurs.  No acute fracture dislocation noted.    ASSESSMENT / PLAN:   1. Routine general medical examination at a health care facility  Routine wellness issues were reviewed today.    2. Screen for colon cancer  Patient has elected to do fit test screening in the past but is not quite due.  Kit was given today and recommended he return it after the July 4 holiday.   - Fecal colorectal cancer screen FIT - Future (S+30); Future    3. Screening for AAA (abdominal aortic aneurysm)  Screening for this was reviewed and not necessary based on lack of family history and non-smoking history.  - Abdominal Aortic Aneurysm Screening/Tracking    4. Acute pain of right knee  Pain in the knee may be related to some early arthritic changes.  Internal derangement of the knee seems  "less likely.  Symptom cares and indications for follow-up were reviewed with the patient.  Modification of activities also briefly discussed.  - XR Knee Right 3 Views; Future    5. Hyperlipidemia with target LDL less than 130  We used a risk calculator today and estimated current risk for cardiovascular event at 15-1/2% over the next 10 years.  Statin therapy would probably lower this by about 3%.  He does not have part D coverage and is going to think about his options at this time.  Future lab orders were placed.  - Lipid panel reflex to direct LDL Fasting; Future  - Comprehensive metabolic panel (BMP + Alb, Alk Phos, ALT, AST, Total. Bili, TP); Future    6. Special screening for malignant neoplasm of prostate  Screening was discussed and recommended.  It has not been quite 1 year since his last was checked so Future order was placed recommend recheck in about 1 month.  - PSA, screen; Future    End of Life Planning:  Patient currently has an advanced directive: No.  I have verified the patient's ablity to prepare an advanced directive/make health care decisions.  Literature was provided to assist patient in preparing an advanced directive.    COUNSELING:  Reviewed preventive health counseling, as reflected in patient instructions       Consider AAA screening for ages 65-75 and smoking history       Regular exercise       Healthy diet/nutrition       Vision screening       Dental care       Hepatitis C screening       Colon cancer screening       Prostate cancer screening        Estimated body mass index is 28.11 kg/(m^2) as calculated from the following:    Height as of this encounter: 5' 8.75\" (1.746 m).    Weight as of this encounter: 189 lb (85.7 kg).     reports that he has never smoked. He has never used smokeless tobacco.      Appropriate preventive services were discussed with this patient, including applicable screening as appropriate for cardiovascular disease, diabetes, osteopenia/osteoporosis, and " glaucoma.  As appropriate for age/gender, discussed screening for colorectal cancer, prostate cancer, breast cancer, and cervical cancer. Checklist reviewing preventive services available has been given to the patient.    Reviewed patients plan of care and provided an AVS. The Basic Care Plan (routine screening as documented in Health Maintenance) for Ridge meets the Care Plan requirement. This Care Plan has been established and reviewed with the Patient.    Counseling Resources:  ATP IV Guidelines  Pooled Cohorts Equation Calculator  Breast Cancer Risk Calculator  FRAX Risk Assessment  ICSI Preventive Guidelines  Dietary Guidelines for Americans, 2010  USDA's MyPlate  ASA Prophylaxis  Lung CA Screening    Jose Delarosa MD  [facility]   PAST SURGICAL HISTORY:  History of back surgery

## 2020-11-08 ENCOUNTER — HEALTH MAINTENANCE LETTER (OUTPATIENT)
Age: 70
End: 2020-11-08

## 2021-07-27 NOTE — PROGRESS NOTES
"Pre-Visit Planning   Mon 8/2/21 @ 1300 ([id number]) w/Dr.Brian Micheal MD for a physical     Appointment Notes for this encounter:   physical      Questionnaires Reviewed/Assigned  No additional questionnaires are needed: N/A    {SCRIPTING IF PT ANSWERS \"Hi, my name is BRUNA Parra RN and I am calling on behalf of your provider's office at [facility].  I am calling to confirm and prep your upcoming appointment on Mon 8/2/21 @ 1300 ([phone]) w/Dr.Brian Micheal MD for a physical . Are there any additional questions or concerns you'd like to review with your provider during your visit?  N/A    Patient preferred phone number: [phone]    Spoke to patient via phone. Are there any additional questions or concerns you'd like to review with your provider during your visit? No     Visit is preventive. Reviewed purpose of preventive visit with patient.    Meds  Is there anything on your medication list that needs to be updated? N/A    Current Outpatient Medications   Medication     aspirin 325 MG tablet     fish oil-omega-3 fatty acids 1000 MG capsule     Flaxseed, Linseed, (GNP FLAXSEED) 1000 MG CAPS     glucosamine-chondroitin (GLUCOSAMINE CHONDR COMPLEX) 500-400 MG CAPS per capsule     Ibuprofen (ADVIL PO)     Multiple Vitamin (MULTI VITAMIN MENS PO)     UNABLE TO FIND     No current facility-administered medications for this visit.     Which pharmacy do you prefer to use for medications during this visit if needed? [id number] [place], [place] - [address] AT [facility] [address]     Do you need refills on any of your medications? N/A    Health Maintenance Due   Topic Date Due     ANNUAL REVIEW OF HM ORDERS  Never done     ZOSTER IMMUNIZATION (1 of 2) Never done     EYE EXAM  07/13/2021     MEDICARE ANNUAL WELLNESS VISIT  08/03/2021     FALL RISK ASSESSMENT  08/03/2021   MyChart  Patient is active on MyChart.    Questionnaire Review   N/A  this time    Call Summary  \"Thank " "you for your time today.  If anything comes up before your appointment, please feel free to contact us at [phone].\"    Aida Kulkarni, Registered Nurse, PAL (Patient Advocate Liaison) [facility]     [phone]    Answers for HPI/ROS submitted by the patient on 7/27/2021  How many servings of fruits and vegetables do you eat daily?: 2-3  On average, how many sweetened beverages do you drink each day (Examples: soda, juice, sweet tea, etc.  Do NOT count diet or artificially sweetened beverages)?: 1  How many minutes a day do you exercise enough to make your heart beat faster?: 20 to 29  How many days a week do you exercise enough to make your heart beat faster?: 5  How many days per week do you miss taking your medication?: 0      "

## 2021-08-02 ENCOUNTER — ANCILLARY PROCEDURE (OUTPATIENT)
Dept: GENERAL RADIOLOGY | Facility: CLINIC | Age: 71
End: 2021-08-02
Attending: FAMILY MEDICINE
Payer: COMMERCIAL

## 2021-08-02 ENCOUNTER — OFFICE VISIT (OUTPATIENT)
Dept: FAMILY MEDICINE | Facility: CLINIC | Age: 71
End: 2021-08-02
Payer: COMMERCIAL

## 2021-08-02 VITALS
SYSTOLIC BLOOD PRESSURE: 138 MMHG | WEIGHT: 187 LBS | OXYGEN SATURATION: 99 % | HEIGHT: 69 IN | HEART RATE: 80 BPM | TEMPERATURE: 97.4 F | RESPIRATION RATE: 12 BRPM | BODY MASS INDEX: 27.7 KG/M2 | DIASTOLIC BLOOD PRESSURE: 80 MMHG

## 2021-08-02 DIAGNOSIS — E66.3 OVERWEIGHT: ICD-10-CM

## 2021-08-02 DIAGNOSIS — G24.5 BLEPHAROSPASM OF LEFT EYE: ICD-10-CM

## 2021-08-02 DIAGNOSIS — M21.612 BUNION, LEFT: ICD-10-CM

## 2021-08-02 DIAGNOSIS — Z12.5 SPECIAL SCREENING FOR MALIGNANT NEOPLASM OF PROSTATE: ICD-10-CM

## 2021-08-02 DIAGNOSIS — R23.8 PAPULE: ICD-10-CM

## 2021-08-02 DIAGNOSIS — Z23 NEED FOR SHINGLES VACCINE: ICD-10-CM

## 2021-08-02 DIAGNOSIS — R13.12 OROPHARYNGEAL DYSPHAGIA: ICD-10-CM

## 2021-08-02 DIAGNOSIS — Z00.00 ENCOUNTER FOR MEDICARE ANNUAL WELLNESS EXAM: Primary | ICD-10-CM

## 2021-08-02 DIAGNOSIS — G62.9 NEUROPATHY: ICD-10-CM

## 2021-08-02 DIAGNOSIS — E78.5 HYPERLIPIDEMIA WITH TARGET LDL LESS THAN 130: ICD-10-CM

## 2021-08-02 DIAGNOSIS — R23.9 UNSPECIFIED SKIN CHANGES: ICD-10-CM

## 2021-08-02 DIAGNOSIS — M17.12 PRIMARY OSTEOARTHRITIS OF LEFT KNEE: ICD-10-CM

## 2021-08-02 DIAGNOSIS — F51.01 PRIMARY INSOMNIA: ICD-10-CM

## 2021-08-02 DIAGNOSIS — R73.9 HYPERGLYCEMIA: ICD-10-CM

## 2021-08-02 DIAGNOSIS — R39.15 URINARY URGENCY: ICD-10-CM

## 2021-08-02 DIAGNOSIS — N52.01 ERECTILE DYSFUNCTION DUE TO ARTERIAL INSUFFICIENCY: ICD-10-CM

## 2021-08-02 DIAGNOSIS — L82.1 SEBORRHEIC KERATOSIS: ICD-10-CM

## 2021-08-02 LAB
ALBUMIN UR-MCNC: NEGATIVE MG/DL
APPEARANCE UR: CLEAR
BILIRUB UR QL STRIP: NEGATIVE
COLOR UR AUTO: YELLOW
GLUCOSE UR STRIP-MCNC: NEGATIVE MG/DL
HBA1C MFR BLD: 5.2 % (ref 0–5.6)
HGB UR QL STRIP: NEGATIVE
KETONES UR STRIP-MCNC: NEGATIVE MG/DL
LEUKOCYTE ESTERASE UR QL STRIP: NEGATIVE
NITRATE UR QL: NEGATIVE
PH UR STRIP: 7 [PH] (ref 5–7)
SP GR UR STRIP: 1.01 (ref 1–1.03)
UROBILINOGEN UR STRIP-ACNC: 0.2 E.U./DL
VIT B12 SERPL-MCNC: 770 PG/ML (ref 193–986)

## 2021-08-02 PROCEDURE — 17110 DESTRUCTION B9 LES UP TO 14: CPT | Performed by: FAMILY MEDICINE

## 2021-08-02 PROCEDURE — 99397 PER PM REEVAL EST PAT 65+ YR: CPT | Mod: 25 | Performed by: FAMILY MEDICINE

## 2021-08-02 PROCEDURE — 80061 LIPID PANEL: CPT | Performed by: FAMILY MEDICINE

## 2021-08-02 PROCEDURE — 81003 URINALYSIS AUTO W/O SCOPE: CPT | Performed by: FAMILY MEDICINE

## 2021-08-02 PROCEDURE — 84443 ASSAY THYROID STIM HORMONE: CPT | Performed by: FAMILY MEDICINE

## 2021-08-02 PROCEDURE — 82607 VITAMIN B-12: CPT | Performed by: FAMILY MEDICINE

## 2021-08-02 PROCEDURE — 83036 HEMOGLOBIN GLYCOSYLATED A1C: CPT | Performed by: FAMILY MEDICINE

## 2021-08-02 PROCEDURE — 73562 X-RAY EXAM OF KNEE 3: CPT | Mod: TC | Performed by: RADIOLOGY

## 2021-08-02 PROCEDURE — 86780 TREPONEMA PALLIDUM: CPT | Performed by: FAMILY MEDICINE

## 2021-08-02 PROCEDURE — 82746 ASSAY OF FOLIC ACID SERUM: CPT | Performed by: FAMILY MEDICINE

## 2021-08-02 PROCEDURE — 36415 COLL VENOUS BLD VENIPUNCTURE: CPT | Performed by: FAMILY MEDICINE

## 2021-08-02 PROCEDURE — 99213 OFFICE O/P EST LOW 20 MIN: CPT | Mod: 25 | Performed by: FAMILY MEDICINE

## 2021-08-02 PROCEDURE — G0103 PSA SCREENING: HCPCS | Performed by: FAMILY MEDICINE

## 2021-08-02 PROCEDURE — 80053 COMPREHEN METABOLIC PANEL: CPT | Performed by: FAMILY MEDICINE

## 2021-08-02 RX ORDER — SILDENAFIL 100 MG/1
100 TABLET, FILM COATED ORAL DAILY PRN
Qty: 5 TABLET | Refills: 1 | Status: SHIPPED | OUTPATIENT
Start: 2021-08-02 | End: 2023-01-10

## 2021-08-02 ASSESSMENT — ENCOUNTER SYMPTOMS
HEMATOCHEZIA: 0
COUGH: 0
UNEXPECTED WEIGHT CHANGE: 0
FATIGUE: 0
WHEEZING: 0
JOINT SWELLING: 0
SHORTNESS OF BREATH: 0
EYES NEGATIVE: 1
ROS SKIN COMMENTS: AS DESCRIBED
POLYDIPSIA: 0
DIFFICULTY URINATING: 1
WEAKNESS: 0
PALPITATIONS: 0
CONSTIPATION: 0
TREMORS: 0
ABDOMINAL PAIN: 0
FEVER: 0
PARESTHESIAS: 0
DYSPHORIC MOOD: 0
TROUBLE SWALLOWING: 0
BRUISES/BLEEDS EASILY: 0
RHINORRHEA: 0
DIARRHEA: 0
ARTHRALGIAS: 1
NUMBNESS: 1
SLEEP DISTURBANCE: 1

## 2021-08-02 ASSESSMENT — ACTIVITIES OF DAILY LIVING (ADL): CURRENT_FUNCTION: NO ASSISTANCE NEEDED

## 2021-08-02 ASSESSMENT — MIFFLIN-ST. JEOR: SCORE: 1598.61

## 2021-08-02 NOTE — PATIENT INSTRUCTIONS
Patient Education   Personalized Prevention Plan  You are due for the preventive services outlined below.  Your care team is available to assist you in scheduling these services.  If you have already completed any of these items, please share that information with your care team to update in your medical record.  Health Maintenance Due   Topic Date Due     ANNUAL REVIEW OF HM ORDERS  Never done     Zoster (Shingles) Vaccine (1 of 2) Never done     Eye Exam  07/13/2021     Annual Wellness Visit  08/03/2021     FALL RISK ASSESSMENT  08/03/2021      Diclofenac (voltaren) gel    Melatonin 10 mg

## 2021-08-02 NOTE — PROGRESS NOTES
"SUBJECTIVE:   Ridge Hutchison is a 71 year old male who presents for Preventive Visit.      Patient has been advised of split billing requirements and indicates understanding: Yes   Are you in the first 12 months of your Medicare coverage?  No    Healthy Habits:     In general, how would you rate your overall health?  Very good    Frequency of exercise:  4-5 days/week    Duration of exercise:  30-45 minutes    Do you usually eat at least 4 servings of fruit and vegetables a day, include whole grains    & fiber and avoid regularly eating high fat or \"junk\" foods?  No    Taking medications regularly:  No    Barriers to taking medications:  None    Medication side effects:  Not applicable    Ability to successfully perform activities of daily living:  No assistance needed    Home Safety:  No safety concerns identified    Hearing Impairment:  No hearing concerns    In the past 6 months, have you been bothered by leaking of urine? Yes    In general, how would you rate your overall mental or emotional health?  Good      PHQ-2 Total Score: 0    Additional concerns today:  Yes    Do you feel safe in your environment? Yes    Have you ever done Advance Care Planning? (For example, a Health Directive, POLST, or a discussion with a medical provider or your loved ones about your wishes): Yes, patient states has an Advance Care Planning document and will bring a copy to the clinic.       Fall risk  Fallen 2 or more times in the past year?: No  Any fall with injury in the past year?: No    Cognitive Screening   1) Repeat 3 items (Leader, Season, Table)    2) Clock draw: NORMAL  3) 3 item recall: Recalls 3 objects  Results: 3 items recalled: COGNITIVE IMPAIRMENT LESS LIKELY    Mini-CogTM Copyright NOEL Buckley. Licensed by the author for use in [facility]; reprinted with permission (keren@.[facility]). All rights reserved.      Do you have sleep apnea, excessive snoring or daytime drowsiness?: yes    Reviewed and updated as " needed this visit by clinical staff  Tobacco  Allergies    Med Hx  Surg Hx  Fam Hx  Soc Hx        Reviewed and updated as needed this visit by Provider                Social History     Tobacco Use     Smoking status: Never Smoker     Smokeless tobacco: Never Used   Substance Use Topics     Alcohol use: Yes     Comment: Once every 2 months     If you drink alcohol do you typically have >3 drinks per day or >7 drinks per week? No      Current providers sharing in care for this patient include: Patient Care Team:  Erick Burton MD as PCP - General (Family Medicine)  Jose Delarosa MD as Assigned PCP    The following health maintenance items are reviewed in Epic and correct as of today:  Health Maintenance Due   Topic Date Due     EYE EXAM  07/13/2021     FALL RISK ASSESSMENT  08/03/2021             Review of Systems   Constitutional: Negative for fatigue, fever and unexpected weight change.   HENT: Negative for ear pain, hearing loss, rhinorrhea and trouble swallowing.    Eyes: Negative.    Respiratory: Negative for cough, shortness of breath and wheezing.    Cardiovascular: Negative for chest pain, palpitations and peripheral edema.   Gastrointestinal: Negative for abdominal pain, constipation, diarrhea and hematochezia.   Endocrine: Negative for polydipsia and polyuria.   Genitourinary: Positive for difficulty urinating, impotence and urgency.   Musculoskeletal: Positive for arthralgias. Negative for joint swelling.   Skin:        As described   Neurological: Positive for numbness. Negative for tremors, weakness and paresthesias.   Hematological: Does not bruise/bleed easily.   Psychiatric/Behavioral: Positive for sleep disturbance. Negative for dysphoric mood.        Feels well when he can fall asleep after his single episode of nocturia, which is not dependable         OBJECTIVE:   /80 (BP Location: Right arm, Patient Position: Chair, Cuff Size: Adult Regular)   Pulse 80   Temp 97.4  F  "(36.3  C) (Oral)   Resp 12   Ht 1.753 m (5' 9\")   Wt 84.8 kg (187 lb)   SpO2 99%   BMI 27.62 kg/m   Estimated body mass index is 27.62 kg/m  as calculated from the following:    Height as of this encounter: 1.753 m (5' 9\").    Weight as of this encounter: 84.8 kg (187 lb).  Physical Exam  Constitutional:       Appearance: Normal appearance.   HENT:      Head: Atraumatic.      Right Ear: Tympanic membrane normal.      Left Ear: Tympanic membrane normal.      Nose: Nose normal.      Mouth/Throat:      Mouth: Mucous membranes are moist.   Eyes:      Pupils: Pupils are equal, round, and reactive to light.   Cardiovascular:      Rate and Rhythm: Normal rate and regular rhythm.      Heart sounds: Normal heart sounds.   Pulmonary:      Effort: Pulmonary effort is normal.      Breath sounds: Normal breath sounds.   Abdominal:      General: Abdomen is flat. Bowel sounds are normal.   Musculoskeletal:      Cervical back: Neck supple.      Comments: Left knee stable to varus/valgus stress, no patellar findings, positive Kana, negative anterior drawer, no joint line tenderness   Skin:     General: Skin is warm and dry.      Comments: Lesions as discussed   Neurological:      General: No focal deficit present.      Mental Status: He is alert.   Psychiatric:         Mood and Affect: Mood normal.               ASSESSMENT / PLAN:     Problem List Items Addressed This Visit     Blepharospasm of left eye     Reported, absent at moment.  Discussed pathophysiology treatment options.  He will observe         Relevant Orders    TSH with free T4 reflex    Bunion, left     Treated with daily toe stretches, silicone toe spacers.  Asymptomatic.  Discussed natural history, treatment options         Erectile dysfunction due to arterial insufficiency     Reported.  Discussed pathophysiology.  Trial of Viagra, broaden database         Relevant Medications    sildenafil (VIAGRA) 100 MG tablet    Hyperglycemia       Not recently.  Broaden " "database.  May be able to retire diagnosis         Relevant Orders    Hemoglobin A1c (Completed)    Hyperlipidemia with target LDL less than 130     He is a candidate for statin therapy, introduced.  He would like to reassess his current status         Relevant Orders    Lipid panel reflex to direct LDL Non-fasting    Need for shingles vaccine     Offered, accepted         Neuropathy     Toe tips feel hypoesthetic at times, those times ill-defined, not presently.  Postulate distal neuropathy.  Broaden database.  Discussed         Relevant Orders    Comprehensive metabolic panel (BMP + Alb, Alk Phos, ALT, AST, Total. Bili, TP)    Vitamin B12 (Completed)    Folate    Treponema Abs w Reflex to RPR and Titer    Hemoglobin A1c (Completed)    Oropharyngeal dysphagia     Rare but recurrent episodes of \"food down the wrong pipe\".  Discussed dysphagia evaluation, natural history.  Observe         Overweight     Current BMI is associated with the longest life.  Discussed his goals         Papule     Right medial upper arm small papule with erythematous base.  Suggestive of basal cell carcinoma.  Arrange biopsy         Primary insomnia     Using antihistamine sleepers.  If he can fall asleep after his episode of nocturia he feels rested otherwise does not.  Does advise antihistamines with potential urinary retention, trial of melatonin         Primary osteoarthritis of left knee     Reported on previous x-ray, not appreciated by right radiology this visit.  Acute onset of pain a few months ago with running responsive to running elimination now gone.  Positive Kana at exam suggests meniscal injury.  Otherwise exam unrevealing.  Discussed natural history.  His desire is to \"heal\".  Strengthen  hamstrings, diclofenac gel         Relevant Orders    XR Knee Left 3 Views (Completed)    Seborrheic keratosis     Typical with keratin plugs in various places about his skin including left shoulder and back.  Discussed natural history " "therapeutic options.  Cryotherapy         Relevant Orders    DESTRUCT BENIGN LESION, UP TO 14 (Completed)    Special screening for malignant neoplasm of prostate     Discussed recommendations around screening intervals..  He has symptoms of BPH.  Broaden database.  He will consider urology referral         Relevant Orders    PSA, screen    Unspecified skin changes      Broaden database         Relevant Orders    TSH with free T4 reflex    Urinary urgency     Nocturia x1 occasional hesitancy occasional urgency no dysuria all suggest BPH.  Discussed natural history.  Recommend avoidance of antihistamines.  Discussed therapeutic options.  He would like to check PSA and consider         Relevant Orders    UA Macro with Reflex to Micro and Culture - lab collect (Completed)      Other Visit Diagnoses     Encounter for Medicare annual wellness exam    -  Primary          Patient has been advised of split billing requirements and indicates understanding: Yes  COUNSELING:      Estimated body mass index is 27.62 kg/m  as calculated from the following:    Height as of this encounter: 1.753 m (5' 9\").    Weight as of this encounter: 84.8 kg (187 lb).    Weight management plan: Discussed    He reports that he has never smoked. He has never used smokeless tobacco.      Appropriate preventive services were discussed with this patient, including applicable screening as appropriate for cardiovascular disease, diabetes, osteopenia/osteoporosis, and glaucoma.  As appropriate for age/gender, discussed screening for colorectal cancer, prostate cancer, breast cancer, and cervical cancer. Checklist reviewing preventive services available has been given to the patient.    Reviewed patients plan of care and provided an AVS. The Basic Care Plan (routine screening as documented in Health Maintenance) for Ridge meets the Care Plan requirement. This Care Plan has been established and reviewed with the Patient.    Counseling Resources:  ATP IV " Guidelines  Pooled Cohorts Equation Calculator  Breast Cancer Risk Calculator  Breast Cancer: Medication to Reduce Risk  FRAX Risk Assessment  ICSI Preventive Guidelines  Dietary Guidelines for Americans, 2010  USDA's MyPlate  ASA Prophylaxis  Lung CA Screening    Erick Burton MD  [facility]    Identified Health Risks:

## 2021-08-03 LAB
ALBUMIN SERPL-MCNC: 4.3 G/DL (ref 3.4–5)
ALP SERPL-CCNC: 92 U/L (ref 40–150)
ALT SERPL W P-5'-P-CCNC: 29 U/L (ref 0–70)
ANION GAP SERPL CALCULATED.3IONS-SCNC: 4 MMOL/L (ref 3–14)
AST SERPL W P-5'-P-CCNC: 16 U/L (ref 0–45)
BILIRUB SERPL-MCNC: 1 MG/DL (ref 0.2–1.3)
BUN SERPL-MCNC: 13 MG/DL (ref 7–30)
CALCIUM SERPL-MCNC: 9.3 MG/DL (ref 8.5–10.1)
CHLORIDE BLD-SCNC: 104 MMOL/L (ref 94–109)
CHOLEST SERPL-MCNC: 231 MG/DL
CO2 SERPL-SCNC: 29 MMOL/L (ref 20–32)
CREAT SERPL-MCNC: 1.06 MG/DL (ref 0.66–1.25)
FASTING STATUS PATIENT QL REPORTED: YES
FOLATE SERPL-MCNC: 50.4 NG/ML
GFR SERPL CREATININE-BSD FRML MDRD: 71 ML/MIN/1.73M2
GLUCOSE BLD-MCNC: 88 MG/DL (ref 70–99)
HDLC SERPL-MCNC: 48 MG/DL
LDLC SERPL CALC-MCNC: 161 MG/DL
NONHDLC SERPL-MCNC: 183 MG/DL
POTASSIUM BLD-SCNC: 4 MMOL/L (ref 3.4–5.3)
PROT SERPL-MCNC: 7.2 G/DL (ref 6.8–8.8)
PSA SERPL-MCNC: 2.45 UG/L (ref 0–4)
SODIUM SERPL-SCNC: 137 MMOL/L (ref 133–144)
T PALLIDUM AB SER QL: NONREACTIVE
TRIGL SERPL-MCNC: 110 MG/DL
TSH SERPL DL<=0.005 MIU/L-ACNC: 1.07 MU/L (ref 0.4–4)

## 2021-08-03 NOTE — ASSESSMENT & PLAN NOTE
Right medial upper arm small papule with erythematous base.  Suggestive of basal cell carcinoma.  Arrange biopsy

## 2021-08-03 NOTE — ASSESSMENT & PLAN NOTE
Discussed recommendations around screening intervals..  He has symptoms of BPH.  Broaden database.  He will consider urology referral

## 2021-08-03 NOTE — ASSESSMENT & PLAN NOTE
Treated with daily toe stretches, silicone toe spacers.  Asymptomatic.  Discussed natural history, treatment options

## 2021-08-03 NOTE — ASSESSMENT & PLAN NOTE
Typical with keratin plugs in various places about his skin including left shoulder and back.  Discussed natural history therapeutic options.  Cryotherapy

## 2021-08-03 NOTE — ASSESSMENT & PLAN NOTE
Using antihistamine sleepers.  If he can fall asleep after his episode of nocturia he feels rested otherwise does not.  Does advise antihistamines with potential urinary retention, trial of melatonin

## 2021-08-03 NOTE — ASSESSMENT & PLAN NOTE
Toe tips feel hypoesthetic at times, those times ill-defined, not presently.  Postulate distal neuropathy.  Broaden database.  Discussed

## 2021-08-03 NOTE — RESULT ENCOUNTER NOTE
These tests look pretty good.  Your cholesterol we spoke about, and a medication is  still recommended, as we discussed.  Risk only goes up.  2 more test will take little bit more time  Erick Burton MD

## 2021-08-03 NOTE — ASSESSMENT & PLAN NOTE
Nocturia x1 occasional hesitancy occasional urgency no dysuria all suggest BPH.  Discussed natural history.  Recommend avoidance of antihistamines.  Discussed therapeutic options.  He would like to check PSA and consider

## 2021-08-03 NOTE — ASSESSMENT & PLAN NOTE
"Reported on previous x-ray, not appreciated by right radiology this visit.  Acute onset of pain a few months ago with running responsive to running elimination now gone.  Positive Kana at exam suggests meniscal injury.  Otherwise exam unrevealing.  Discussed natural history.  His desire is to \"heal\".  Strengthen  hamstrings, diclofenac gel  "

## 2021-08-03 NOTE — ASSESSMENT & PLAN NOTE
"Rare but recurrent episodes of \"food down the wrong pipe\".  Discussed dysphagia evaluation, natural history.  Observe  "

## 2021-08-16 ENCOUNTER — OFFICE VISIT (OUTPATIENT)
Dept: FAMILY MEDICINE | Facility: CLINIC | Age: 71
End: 2021-08-16
Payer: COMMERCIAL

## 2021-08-16 VITALS
HEART RATE: 76 BPM | RESPIRATION RATE: 16 BRPM | HEIGHT: 69 IN | DIASTOLIC BLOOD PRESSURE: 88 MMHG | SYSTOLIC BLOOD PRESSURE: 151 MMHG | TEMPERATURE: 97.6 F | BODY MASS INDEX: 27.99 KG/M2 | WEIGHT: 189 LBS | OXYGEN SATURATION: 97 %

## 2021-08-16 DIAGNOSIS — E78.5 HYPERLIPIDEMIA WITH TARGET LDL LESS THAN 130: Primary | ICD-10-CM

## 2021-08-16 DIAGNOSIS — R23.8 PAPULE: ICD-10-CM

## 2021-08-16 DIAGNOSIS — I10 ESSENTIAL HYPERTENSION: ICD-10-CM

## 2021-08-16 DIAGNOSIS — M25.562 LEFT KNEE PAIN, UNSPECIFIED CHRONICITY: ICD-10-CM

## 2021-08-16 PROCEDURE — 88305 TISSUE EXAM BY PATHOLOGIST: CPT | Performed by: PATHOLOGY

## 2021-08-16 PROCEDURE — 88342 IMHCHEM/IMCYTCHM 1ST ANTB: CPT | Performed by: PATHOLOGY

## 2021-08-16 PROCEDURE — 11104 PUNCH BX SKIN SINGLE LESION: CPT | Performed by: FAMILY MEDICINE

## 2021-08-16 PROCEDURE — 99214 OFFICE O/P EST MOD 30 MIN: CPT | Mod: 25 | Performed by: FAMILY MEDICINE

## 2021-08-16 PROCEDURE — 88341 IMHCHEM/IMCYTCHM EA ADD ANTB: CPT | Performed by: PATHOLOGY

## 2021-08-16 RX ORDER — ROSUVASTATIN CALCIUM 40 MG/1
40 TABLET, COATED ORAL DAILY
Qty: 90 TABLET | Refills: 3 | Status: SHIPPED | OUTPATIENT
Start: 2021-08-16 | End: 2023-01-10

## 2021-08-16 ASSESSMENT — MIFFLIN-ST. JEOR: SCORE: 1607.68

## 2021-08-16 NOTE — PROGRESS NOTES
"    Assessment & Plan   Problem List Items Addressed This Visit     Essential hypertension     Likely.  Majority of previous measures in mild extremis.  He would like to check a few more times.  Discussed caffeine different criteria for medical intervention over time.  We agreed to check blood pressure one more time at next visit before deciding         Hyperlipidemia with target LDL less than 130 - Primary     Discussed risk benefit.  Is concerned his insurance coverage.  Of a statin.  He proposes to investigate.  He seems unfamiliar with details of his insurance coverage for outpatient prescription benefit         Relevant Medications    rosuvastatin (CRESTOR) 40 MG tablet    Left knee pain     Early OA likely not detected by x-ray.  Mild response to twice daily diclofenac gel.  Refer for further considerations         Relevant Orders    Orthopedic  Referral    Papule     Biopsy.         Relevant Orders    Surgical pathology exam                        Return in about 2 weeks (around 8/30/2021) for recheck.    Erick Burton MD  [facility]    Yunior farooq is a 71 year old who presents for the following health issues     HPI     Mole   Onset/Duration: a few years ago  Description (location/number): forearm, chest, and back total of 3  Accompanying signs and symptoms (pain, redness): no  History: prior warts: YES  Therapies tried and outcome: liquid nitrogen    Seborrheic keratoses on chest recently treated with cryotherapy.  Papule on right arm with plan for biopsy.  Also discovered to have high risk of cardiovascular event based on ASCVD formula and screening.  Statin has been recommended        Review of Systems   No itching no bleeding      Objective    BP (!) 151/88 (BP Location: Right arm, Patient Position: Sitting, Cuff Size: Adult Regular)   Pulse 76   Temp 97.6  F (36.4  C) (Oral)   Resp 16   Ht 1.753 m (5' 9\")   Wt 85.7 kg (189 lb)   SpO2 97%   BMI 27.91 " kg/m    Body mass index is 27.91 kg/m .  Physical Exam     Previously treated seborrheic keratoses have changes consistent with impending desquamation.  Right shoulder lesion, large largest,    Informed consent reviewed, questions answered, and signed. Time out taken.   After the verification of the absence of allergies, the patient was prepped and draped in the usual fashion utilizing betadine. After adequate anesthesia was verified using Marcaine with epinephrine, punch biopsy encompassing the lesion en toto was undertaken and the lesion was removed.  Cautery used to achieve adequate hemostasis adequate hemostasis was noted and the wound was closed with subcuticular stitch of Vicryl.  Dressed.  Dressed in usual fashion. Care of the wound discussed.Patient is instructed to call for any concerns or questions. Submitted to pathology, Clinical impression: Papule rule out basal cell carcinoma  Erick Burton MD

## 2021-08-17 NOTE — ASSESSMENT & PLAN NOTE
Early OA likely not detected by x-ray.  Mild response to twice daily diclofenac gel.  Refer for further considerations

## 2021-08-17 NOTE — ASSESSMENT & PLAN NOTE
Discussed risk benefit.  Is concerned his insurance coverage.  Of a statin.  He proposes to investigate.  He seems unfamiliar with details of his insurance coverage for outpatient prescription benefit

## 2021-08-17 NOTE — ASSESSMENT & PLAN NOTE
Likely.  Majority of previous measures in mild extremis.  He would like to check a few more times.  Discussed caffeine different criteria for medical intervention over time.  We agreed to check blood pressure one more time at next visit before deciding

## 2021-08-19 PROBLEM — D36.10 NEUROFIBROMA: Status: ACTIVE | Noted: 2021-08-19

## 2021-08-19 LAB
PATH REPORT.COMMENTS IMP SPEC: NORMAL
PATH REPORT.COMMENTS IMP SPEC: NORMAL
PATH REPORT.FINAL DX SPEC: NORMAL
PATH REPORT.GROSS SPEC: NORMAL
PATH REPORT.MICROSCOPIC SPEC OTHER STN: NORMAL
PATH REPORT.RELEVANT HX SPEC: NORMAL
PHOTO IMAGE: NORMAL

## 2021-08-19 NOTE — RESULT ENCOUNTER NOTE
The lesion was a neurofibroma.  This is a tumor of nerve tissue.  They are in general not malignant.  Erick Burton MD

## 2021-09-07 ENCOUNTER — MYC MEDICAL ADVICE (OUTPATIENT)
Dept: FAMILY MEDICINE | Facility: CLINIC | Age: 71
End: 2021-09-07

## 2021-09-11 ENCOUNTER — HEALTH MAINTENANCE LETTER (OUTPATIENT)
Age: 71
End: 2021-09-11

## 2021-09-13 ENCOUNTER — OFFICE VISIT (OUTPATIENT)
Dept: FAMILY MEDICINE | Facility: CLINIC | Age: 71
End: 2021-09-13
Payer: COMMERCIAL

## 2021-09-13 VITALS
SYSTOLIC BLOOD PRESSURE: 132 MMHG | DIASTOLIC BLOOD PRESSURE: 84 MMHG | WEIGHT: 185.8 LBS | HEART RATE: 95 BPM | BODY MASS INDEX: 27.44 KG/M2 | TEMPERATURE: 97.4 F | OXYGEN SATURATION: 98 %

## 2021-09-13 DIAGNOSIS — R35.1 BENIGN PROSTATIC HYPERPLASIA WITH NOCTURIA: Primary | ICD-10-CM

## 2021-09-13 DIAGNOSIS — Q85.01 NEUROFIBROMATOSIS, PERIPHERAL, NF1 (H): ICD-10-CM

## 2021-09-13 DIAGNOSIS — L82.1 SEBORRHEIC KERATOSIS: ICD-10-CM

## 2021-09-13 DIAGNOSIS — N40.1 BENIGN PROSTATIC HYPERPLASIA WITH NOCTURIA: Primary | ICD-10-CM

## 2021-09-13 PROBLEM — R23.8 PAPULE: Status: RESOLVED | Noted: 2021-08-02 | Resolved: 2021-09-13

## 2021-09-13 PROCEDURE — 99213 OFFICE O/P EST LOW 20 MIN: CPT | Mod: 25 | Performed by: FAMILY MEDICINE

## 2021-09-13 PROCEDURE — 17110 DESTRUCTION B9 LES UP TO 14: CPT | Performed by: FAMILY MEDICINE

## 2021-09-13 NOTE — PROGRESS NOTES
Future Appointments   Date Time Provider Department Center   9/13/2021  2:40 PM Erick Burton MD [facility]     Appointment Notes for this encounter:   Examine wart. BP check and discussion.    Health Maintenance Due   Topic Date Due     EYE EXAM  07/13/2021     INFLUENZA VACCINE (1) Never done     ZOSTER IMMUNIZATION (2 of 2) 09/27/2021     Health Maintenance addressed:  Immunizations    Immunizations Pt declined    MyChart Status:  Active and Using      Assessment & Plan   Problem List Items Addressed This Visit     Neurofibromatosis, peripheral, NF1 (H)     His stitches not dissolved.  Removed with scissors no blood loss.  He has discovered a similar lesion in his left antecubital fossa.  Therefore, neurofibromatosis         Seborrheic keratosis     Left shoulder, excellent response to previous cryotherapy.  Reapplied         Relevant Orders    DESTRUCT BENIGN LESION, UP TO 14 (Completed)      Other Visit Diagnoses     Benign prostatic hyperplasia with nocturia    -  Primary    Relevant Orders    Adult Urology Referral                        No follow-ups on file.    Erick Burton MD  [facility] ABIGAIL farooq is a 71 year old who presents for the following health issues     History of Present Illness       Hypertension: He presents for follow up of hypertension.  He does not check blood pressure  regularly outside of the clinic. Outside blood pressures have been over 140/90. He does not follow a low salt diet.         Warts  Onset/Duration: a couple of years  Description (location/number): left shoulder, 1  Accompanying signs and symptoms (pain, redness): no  History: prior warts: YES  Therapies tried and outcome: liquid nitrogen              Review of Systems   Previously discussed, he has decided he would like to see urology regarding his lower urinary tract symptoms.  In addition, he is interested in pursuing his left knee discomfort to improve his ability to run.  He has  already been contacted from my previous referral.  Lastly, he is quite concerned about HPV and his sexual contacts over the years.  Discussed      Objective    /84 (BP Location: Right arm, Patient Position: Sitting, Cuff Size: Adult Regular)   Pulse 95   Temp 97.4  F (36.3  C) (Oral)   Wt 84.3 kg (185 lb 12.8 oz)   SpO2 98%   BMI 27.44 kg/m    Body mass index is 27.44 kg/m .  Physical Exam     Skin as described  Cryotherapy applied to remnant of seborrheic keratosis left shoulder  Trace synovitis left knee  Erick Burton MD

## 2021-09-14 NOTE — ASSESSMENT & PLAN NOTE
His stitches not dissolved.  Removed with scissors no blood loss.  He has discovered a similar lesion in his left antecubital fossa.  Therefore, neurofibromatosis

## 2021-09-15 ENCOUNTER — TELEPHONE (OUTPATIENT)
Dept: ORTHOPEDICS | Facility: CLINIC | Age: 71
End: 2021-09-15

## 2021-09-15 NOTE — TELEPHONE ENCOUNTER
left voicemail for patient due to double booking at 11:00 AM.   Scheduling staff scheduled 2 different appointments at the same time, causing double book.  Request to reschedule appointment, OK to double book at 10AM.      Nicole Panda, ATC

## 2021-09-28 ENCOUNTER — OFFICE VISIT (OUTPATIENT)
Dept: ORTHOPEDICS | Facility: CLINIC | Age: 71
End: 2021-09-28
Attending: FAMILY MEDICINE
Payer: COMMERCIAL

## 2021-09-28 VITALS
HEIGHT: 69 IN | BODY MASS INDEX: 27.25 KG/M2 | SYSTOLIC BLOOD PRESSURE: 140 MMHG | WEIGHT: 184 LBS | DIASTOLIC BLOOD PRESSURE: 82 MMHG

## 2021-09-28 DIAGNOSIS — M17.12 OSTEOARTHROSIS, LOCALIZED, PRIMARY, KNEE, LEFT: ICD-10-CM

## 2021-09-28 PROCEDURE — 99203 OFFICE O/P NEW LOW 30 MIN: CPT | Performed by: STUDENT IN AN ORGANIZED HEALTH CARE EDUCATION/TRAINING PROGRAM

## 2021-09-28 ASSESSMENT — KOOS JR
GOING UP OR DOWN STAIRS: MILD
HOW SEVERE IS YOUR KNEE STIFFNESS AFTER FIRST WAKING IN MORNING: MILD
TWISING OR PIVOTING ON KNEE: MILD
RISING FROM SITTING: MILD
KOOS JR SCORING: 68.28
STANDING UPRIGHT: MILD
BENDING TO THE FLOOR TO PICK UP OBJECT: MILD
STRAIGHTENING KNEE FULLY: MILD

## 2021-09-28 ASSESSMENT — MIFFLIN-ST. JEOR: SCORE: 1585

## 2021-09-28 NOTE — PATIENT INSTRUCTIONS
1. Left knee pain, unspecified chronicity        Physical Therapy orders have been placed with [facility].  You can call [phone] to schedule at your convenience.       Follow up with Dr. Jang as needed.    Call my office with any questions or concerns, [phone].

## 2021-09-28 NOTE — LETTER
"    9/28/2021         RE: Rdige Hutchison  [address]  [facility] [address]        Dear Colleague,    Thank you for referring your patient, Rdige Hutchison, to the [facility] [place]. Please see a copy of my visit note below.        [facility]  Orthopaedic Surgery Consultation by Dhaval Jang M.D.    Ridge Hutchison MRN# [id number]   Age: 71 year old YOB: 1950     Requesting physician: Erick Jay     Background history:  DX:  1. Neurofibromatosis type I  2. Hyperlipidemia  3. BPH    TREATMENTS:  1. None           History of Present Illness:   71 year old male presents her clinic because of chronic left knee pain.  This pain has been present for multiple years and comes in episodes after running or increased activity.  Patient describes pain in the medial side of the knee.  He denies the presence of significant swelling, mechanical symptoms or giving way.  With rest, ice and topical Voltaren he is able to mitigate the pain quite nicely.  He wants to know today if he can return to running.  Patient denies the presence of night pain, initiation stiffness or soreness.   He has not seen a physical therapist yet.  He has not tried any intra-articular injections.  Patient denies the presence of left-sided hip and lower back pain.  No motor or sensory deficits.      Social:   Occupation: Retired  Living situation: lives alone  Hobbies / Sports: running, staying active    Smoking: No  Alcohol: No  Illicit drug use: No         Physical Exam:     EXAMINATION pertinent findings:   PSYCH: Pleasant, healthy-appearing, alert, oriented x3, cooperative. Normal mood and affect.  VITAL SIGNS: Blood pressure (!) 140/82, height 1.753 m (5' 9\"), weight 83.5 kg (184 lb).  Reviewed nursing intake notes.   Body mass index is 27.17 kg/m .  RESP: non labored breathing   ABD: benign, soft, non-tender, no acute peritoneal findings  SKIN: grossly normal   LYMPHATIC: " grossly normal, no adenopathy, no extremity edema  NEURO: grossly normal , no motor deficits  VASCULAR: satisfactory perfusion of all extremities   MUSCULOSKELETAL:   Alignment: Slight varus alignment of left lower extremity.  Gait: Normal.  The left hip exhibits a full range of motion.  No pain upon rotations.  Lasegue's test is negative.    L knee: -0-0  .  Deep flexion is slightly painful.  Straight leg raise +. No redness, warmth or skin changes present. Effusion No. Ligamentously stable in both ML and AP direction. Normal PF tracking without crepitus.  Meniscal provocation tests are negative.  There is some tenderness to palpation over the medial joint line.   Left LE:   Thigh and leg compartments soft and compressible   +Quad/TA/GSC/FHL/EHL   SILT DP/SP/Linden/Saph/Tib nerve distributions   Palpable dorsalis pedis pulse          Data:   All laboratory data reviewed  All imaging studies reviewed by me personally.    XR knee left 8/2/2021:  My interpretation: Early degenerative changes most notably in the medial compartment with minimal joint space narrowing, presence of small marginal osteophytes and sclerosis.  Well-preserved patellofemoral and lateral compartments.            Assessment and Plan:   Assessment:  71-year-old male with chronic pain of left knee due to early degenerative changes most notably in the medial compartment.     Plan:  I extensively discussed my findings with the patient.  It would be my recommendation to continue to pursue optimize nonoperative treatment measures.  This would consist of activity modification, over-the-counter analgesics, physical therapy for range of motion and strengthening exercises and potentially in the future medial  bracing and intra-articular injection with combination of lidocaine and cortisone.  It would be my recommendation to refrain from running as this is a high impact loading activity.  We discussed sports like cycling, swimming and running on  an elliptical or less impactful on the knee.  Patient understands and agrees to the treatment plan as set forth.  A referral to physical therapy was provided and we will follow-up on an as-needed basis.    Thank you for your referral.      Dhaval Jang MD, PhD     Adult Reconstruction  [facility] Department of Orthopaedic Surgery  Pager [phone]      DATA for DOCUMENTATION:         Past Medical History:     Patient Active Problem List   Diagnosis     Hyperlipidemia with target LDL less than 130     Atypical nevus     Bunion, left     Urinary urgency     Blepharospasm of left eye     Overweight     Seborrheic keratosis     Neuropathy     Erectile dysfunction due to arterial insufficiency     Special screening for malignant neoplasm of prostate     Unspecified skin changes      Left knee pain     Hyperglycemia     Need for shingles vaccine     Primary insomnia     Oropharyngeal dysphagia     Essential hypertension     Neurofibromatosis, peripheral, NF1 (H)     Past Medical History:   Diagnosis Date     Arthritis 2014    Big left toe     Atypical nevus 7/9/2014     Do you wish to do the replacement in the background? yes       Blepharospasm of left eye 8/2/2021     Bunion, left 8/2/2021     Essential hypertension 8/16/2021     Hyperlipidemia with target LDL less than 130 7/7/2010     Diagnosis updated by automated process. Provider to review and confirm.     Hypertension     Need to have checked     Left knee pain 8/2/2021     Neurofibroma 8/19/2021     Oropharyngeal dysphagia 8/2/2021     Overweight 8/2/2021     Papule 8/2/2021     Primary insomnia 8/2/2021     Pure hypercholesterolemia      Seborrheic keratosis 8/2/2021     Urinary urgency 8/2/2021       Also see scanned health assessment forms.       Past Surgical History:     Past Surgical History:   Procedure Laterality Date     DENTAL SURGERY      wisdom teeth removal     HC REMOVAL OF TONSILS,<11 Y/O       SOFT TISSUE  SURGERY  1995    egg sized fibroid tumor on side of right calf     ZZC NONSPECIFIC PROCEDURE  1995    right leg fibroid tumor removal            Social History:     Social History     Socioeconomic History     Marital status:      Spouse name: Not on file     Number of children: 2     Years of education: Not on file     Highest education level: Not on file   Occupational History     Occupation:      Comment: [employer] [place] [employer]   Tobacco Use     Smoking status: Never Smoker     Smokeless tobacco: Never Used   Vaping Use     Vaping Use: Never used   Substance and Sexual Activity     Alcohol use: Not Currently     Comment: One beer every 3 months.     Drug use: Never     Sexual activity: Not Currently     Partners: Female   Other Topics Concern     Parent/sibling w/ CABG, MI or angioplasty before 65F 55M? No   Social History Narrative    Live alone.   x 1.     Social Determinants of Health     Financial Resource Strain:      Difficulty of Paying Living Expenses:    Food Insecurity:      Worried About Running Out of Food in the Last Year:      Ran Out of Food in the Last Year:    Transportation Needs:      Lack of Transportation (Medical):      Lack of Transportation (Non-Medical):    Physical Activity:      Days of Exercise per Week:      Minutes of Exercise per Session:    Stress:      Feeling of Stress :    Social Connections:      Frequency of Communication with Friends and Family:      Frequency of Social Gatherings with Friends and Family:      Attends [employer] Services:      Active Member of Clubs or Organizations:      Attends Club or Organization Meetings:      Marital Status:    Intimate Partner Violence:      Fear of Current or Ex-Partner:      Emotionally Abused:      Physically Abused:      Sexually Abused:             Family History:       Family History   Problem Relation Age of Onset     C.A.D. Father      Hypertension Father      Eye Disorder Father          macular degeneration     Heart Failure Father          age 90     Macular Degeneration Father      Genitourinary Problems Mother         bladder falling     Dementia Mother          age 93     Osteoporosis Mother      Heart Disease Maternal Grandmother          from heart disease,     Cancer Paternal Grandfather         bone     C.A.D. Paternal Grandmother      Arthritis Sister         knee replacements due to arthritis     Hypertension Sister      Glaucoma Sister      Multiple Sclerosis Sister      Crohn's Disease Son             Medications:     Current Outpatient Medications   Medication Sig     aspirin 325 MG tablet      Flaxseed, Linseed, (GNP FLAXSEED) 1000 MG CAPS Take 3,000 mg by mouth daily     Ibuprofen (ADVIL PO) Take 1-2 tablets by mouth as needed for moderate pain     Multiple Vitamin (MULTI VITAMIN MENS PO)      rosuvastatin (CRESTOR) 40 MG tablet Take 1 tablet (40 mg) by mouth daily     sildenafil (VIAGRA) 100 MG tablet Take 1 tablet (100 mg) by mouth daily as needed (interest)     UNABLE TO FIND MEDICATION NAME: Powder organic proteins.     No current facility-administered medications for this visit.              Review of Systems:   A comprehensive 10 point review of systems (constitutional, ENT, cardiac, peripheral vascular, lymphatic, respiratory, GI, , Musculoskeletal, skin, Neurological) was performed and found to be negative except as described in this note.     See intake form completed by patient          Again, thank you for allowing me to participate in the care of your patient.        Sincerely,        Dhaval Jang MD

## 2021-09-28 NOTE — PROGRESS NOTES
"    [facility]  Orthopaedic Surgery Consultation by Dhaval Jang M.D.    Ridge Hutchison MRN# [id number]   Age: 71 year old YOB: 1950     Requesting physician: Erick Jay     Background history:  DX:  1. Neurofibromatosis type I  2. Hyperlipidemia  3. BPH    TREATMENTS:  1. None           History of Present Illness:   71 year old male presents her clinic because of chronic left knee pain.  This pain has been present for multiple years and comes in episodes after running or increased activity.  Patient describes pain in the medial side of the knee.  He denies the presence of significant swelling, mechanical symptoms or giving way.  With rest, ice and topical Voltaren he is able to mitigate the pain quite nicely.  He wants to know today if he can return to running.  Patient denies the presence of night pain, initiation stiffness or soreness.   He has not seen a physical therapist yet.  He has not tried any intra-articular injections.  Patient denies the presence of left-sided hip and lower back pain.  No motor or sensory deficits.      Social:   Occupation: Retired  Living situation: lives alone  Hobbies / Sports: running, staying active    Smoking: No  Alcohol: No  Illicit drug use: No         Physical Exam:     EXAMINATION pertinent findings:   PSYCH: Pleasant, healthy-appearing, alert, oriented x3, cooperative. Normal mood and affect.  VITAL SIGNS: Blood pressure (!) 140/82, height 1.753 m (5' 9\"), weight 83.5 kg (184 lb).  Reviewed nursing intake notes.   Body mass index is 27.17 kg/m .  RESP: non labored breathing   ABD: benign, soft, non-tender, no acute peritoneal findings  SKIN: grossly normal   LYMPHATIC: grossly normal, no adenopathy, no extremity edema  NEURO: grossly normal , no motor deficits  VASCULAR: satisfactory perfusion of all extremities   MUSCULOSKELETAL:   Alignment: Slight varus alignment of left lower extremity.  Gait: Normal.  The left hip exhibits " a full range of motion.  No pain upon rotations.  Lasegue's test is negative.    L knee: -0-0  .  Deep flexion is slightly painful.  Straight leg raise +. No redness, warmth or skin changes present. Effusion No. Ligamentously stable in both ML and AP direction. Normal PF tracking without crepitus.  Meniscal provocation tests are negative.  There is some tenderness to palpation over the medial joint line.   Left LE:   Thigh and leg compartments soft and compressible   +Quad/TA/GSC/FHL/EHL   SILT DP/SP/Linden/Saph/Tib nerve distributions   Palpable dorsalis pedis pulse          Data:   All laboratory data reviewed  All imaging studies reviewed by me personally.    XR knee left 8/2/2021:  My interpretation: Early degenerative changes most notably in the medial compartment with minimal joint space narrowing, presence of small marginal osteophytes and sclerosis.  Well-preserved patellofemoral and lateral compartments.            Assessment and Plan:   Assessment:  71-year-old male with chronic pain of left knee due to early degenerative changes most notably in the medial compartment.     Plan:  I extensively discussed my findings with the patient.  It would be my recommendation to continue to pursue optimize nonoperative treatment measures.  This would consist of activity modification, over-the-counter analgesics, physical therapy for range of motion and strengthening exercises and potentially in the future medial  bracing and intra-articular injection with combination of lidocaine and cortisone.  It would be my recommendation to refrain from running as this is a high impact loading activity.  We discussed sports like cycling, swimming and running on an elliptical or less impactful on the knee.  Patient understands and agrees to the treatment plan as set forth.  A referral to physical therapy was provided and we will follow-up on an as-needed basis.    Thank you for your referral.      Dhaval Jang MD,  PhD     Adult Reconstruction  [facility] Department of Orthopaedic Surgery  Pager [phone]      DATA for DOCUMENTATION:         Past Medical History:     Patient Active Problem List   Diagnosis     Hyperlipidemia with target LDL less than 130     Atypical nevus     Bunion, left     Urinary urgency     Blepharospasm of left eye     Overweight     Seborrheic keratosis     Neuropathy     Erectile dysfunction due to arterial insufficiency     Special screening for malignant neoplasm of prostate     Unspecified skin changes      Left knee pain     Hyperglycemia     Need for shingles vaccine     Primary insomnia     Oropharyngeal dysphagia     Essential hypertension     Neurofibromatosis, peripheral, NF1 (H)     Past Medical History:   Diagnosis Date     Arthritis 2014    Big left toe     Atypical nevus 7/9/2014     Do you wish to do the replacement in the background? yes       Blepharospasm of left eye 8/2/2021     Bunion, left 8/2/2021     Essential hypertension 8/16/2021     Hyperlipidemia with target LDL less than 130 7/7/2010     Diagnosis updated by automated process. Provider to review and confirm.     Hypertension     Need to have checked     Left knee pain 8/2/2021     Neurofibroma 8/19/2021     Oropharyngeal dysphagia 8/2/2021     Overweight 8/2/2021     Papule 8/2/2021     Primary insomnia 8/2/2021     Pure hypercholesterolemia      Seborrheic keratosis 8/2/2021     Urinary urgency 8/2/2021       Also see scanned health assessment forms.       Past Surgical History:     Past Surgical History:   Procedure Laterality Date     DENTAL SURGERY      wisdom teeth removal     HC REMOVAL OF TONSILS,<13 Y/O       SOFT TISSUE SURGERY  1995    egg sized fibroid tumor on side of right calf     ZZC NONSPECIFIC PROCEDURE  1995    right leg fibroid tumor removal            Social History:     Social History     Socioeconomic History     Marital status:      Spouse name: Not on file      Number of children: 2     Years of education: Not on file     Highest education level: Not on file   Occupational History     Occupation:      Comment: [employer] [place]   Tobacco Use     Smoking status: Never Smoker     Smokeless tobacco: Never Used   Vaping Use     Vaping Use: Never used   Substance and Sexual Activity     Alcohol use: Not Currently     Comment: One beer every 3 months.     Drug use: Never     Sexual activity: Not Currently     Partners: Female   Other Topics Concern     Parent/sibling w/ CABG, MI or angioplasty before 65F 55M? No   Social History Narrative    Live alone.   x 1.     Social Determinants of Health     Financial Resource Strain:      Difficulty of Paying Living Expenses:    Food Insecurity:      Worried About Running Out of Food in the Last Year:      Ran Out of Food in the Last Year:    Transportation Needs:      Lack of Transportation (Medical):      Lack of Transportation (Non-Medical):    Physical Activity:      Days of Exercise per Week:      Minutes of Exercise per Session:    Stress:      Feeling of Stress :    Social Connections:      Frequency of Communication with Friends and Family:      Frequency of Social Gatherings with Friends and Family:      Attends Hoahaoism Services:      Active Member of Clubs or Organizations:      Attends Club or Organization Meetings:      Marital Status:    Intimate Partner Violence:      Fear of Current or Ex-Partner:      Emotionally Abused:      Physically Abused:      Sexually Abused:             Family History:       Family History   Problem Relation Age of Onset     C.A.D. Father      Hypertension Father      Eye Disorder Father         macular degeneration     Heart Failure Father          age 90     Macular Degeneration Father      Genitourinary Problems Mother         bladder falling     Dementia Mother          age 93     Osteoporosis Mother      Heart Disease Maternal Grandmother           from heart disease,     Cancer Paternal Grandfather         bone     C.AJOSE L. Paternal Grandmother      Arthritis Sister         knee replacements due to arthritis     Hypertension Sister      Glaucoma Sister      Multiple Sclerosis Sister      Crohn's Disease Son             Medications:     Current Outpatient Medications   Medication Sig     aspirin 325 MG tablet      Flaxseed, Linseed, (GNP FLAXSEED) 1000 MG CAPS Take 3,000 mg by mouth daily     Ibuprofen (ADVIL PO) Take 1-2 tablets by mouth as needed for moderate pain     Multiple Vitamin (MULTI VITAMIN MENS PO)      rosuvastatin (CRESTOR) 40 MG tablet Take 1 tablet (40 mg) by mouth daily     sildenafil (VIAGRA) 100 MG tablet Take 1 tablet (100 mg) by mouth daily as needed (interest)     UNABLE TO FIND MEDICATION NAME: Powder organic proteins.     No current facility-administered medications for this visit.              Review of Systems:   A comprehensive 10 point review of systems (constitutional, ENT, cardiac, peripheral vascular, lymphatic, respiratory, GI, , Musculoskeletal, skin, Neurological) was performed and found to be negative except as described in this note.     See intake form completed by patient

## 2021-10-07 ENCOUNTER — THERAPY VISIT (OUTPATIENT)
Dept: PHYSICAL THERAPY | Facility: CLINIC | Age: 71
End: 2021-10-07
Attending: STUDENT IN AN ORGANIZED HEALTH CARE EDUCATION/TRAINING PROGRAM
Payer: COMMERCIAL

## 2021-10-07 DIAGNOSIS — M17.12 OSTEOARTHROSIS, LOCALIZED, PRIMARY, KNEE, LEFT: ICD-10-CM

## 2021-10-07 PROCEDURE — 97161 PT EVAL LOW COMPLEX 20 MIN: CPT | Mod: GP | Performed by: PHYSICAL THERAPIST

## 2021-10-07 PROCEDURE — 97110 THERAPEUTIC EXERCISES: CPT | Mod: GP | Performed by: PHYSICAL THERAPIST

## 2021-10-07 ASSESSMENT — ACTIVITIES OF DAILY LIVING (ADL)
PAIN: THE SYMPTOM AFFECTS MY ACTIVITY SLIGHTLY
GO DOWN STAIRS: ACTIVITY IS MINIMALLY DIFFICULT
STIFFNESS: THE SYMPTOM AFFECTS MY ACTIVITY SLIGHTLY
RISE FROM A CHAIR: ACTIVITY IS MINIMALLY DIFFICULT
WEAKNESS: I DO NOT HAVE THE SYMPTOM
KNEEL ON THE FRONT OF YOUR KNEE: ACTIVITY IS NOT DIFFICULT
LIMPING: NOT ANSWERED
HOW_WOULD_YOU_RATE_THE_CURRENT_FUNCTION_OF_YOUR_KNEE_DURING_YOUR_USUAL_DAILY_ACTIVITIES_ON_A_SCALE_FROM_0_TO_100_WITH_100_BEING_YOUR_LEVEL_OF_KNEE_FUNCTION_PRIOR_TO_YOUR_INJURY_AND_0_BEING_THE_INABILITY_TO_PERFORM_ANY_OF_YOUR_USUAL_DAILY_ACTIVITIES?: 40
STAND: NOT ANSWERED
WALK: ACTIVITY IS NOT DIFFICULT
GIVING WAY, BUCKLING OR SHIFTING OF KNEE: NOT ANSWERED
KNEE_ACTIVITY_OF_DAILY_LIVING_SUM: 48
SQUAT: ACTIVITY IS NOT DIFFICULT
GO UP STAIRS: ACTIVITY IS NOT DIFFICULT
AS_A_RESULT_OF_YOUR_KNEE_INJURY,_HOW_WOULD_YOU_RATE_YOUR_CURRENT_LEVEL_OF_DAILY_ACTIVITY?: NEARLY NORMAL
SIT WITH YOUR KNEE BENT: ACTIVITY IS NOT DIFFICULT
HOW_WOULD_YOU_RATE_THE_OVERALL_FUNCTION_OF_YOUR_KNEE_DURING_YOUR_USUAL_DAILY_ACTIVITIES?: NEARLY NORMAL
SWELLING: I HAVE THE SYMPTOM BUT IT DOES NOT AFFECT MY ACTIVITY

## 2021-10-07 NOTE — PROGRESS NOTES
Physical Therapy Initial Evaluation  Subjective:  Onset of left knee pain 6-10-21 while jogging. Pt referred by MD for physical therapy on 9-28-21.    The history is provided by the patient. No  was used.   Patient Health History  Ridge Hutchison being seen for Left knee arthritis  and cartilage wear.     Problem began: 6/10/2021.   Problem occurred: Jogging   Pain is reported as 1/10 on pain scale.  General health as reported by patient is good.  Pertinent medical history includes: high blood pressure and osteoporosis.        Surgeries include:  None.    Current medications:  None.    Current occupation is none.   Primary job tasks include:  Computer work, driving and lifting/carrying.                  Therapist Generated HPI Evaluation         Type of problem:  Left knee.    This is a new condition.  Condition occurred with:  Repetition/overuse.  Where condition occurred: during recreation/sport.  Patient reports pain:  Medial.  Pain is described as aching and sharp and is intermittent.  Pain is worse during the day.  Since onset symptoms are gradually improving.  Associated symptoms:  Edema, loss of motion/stiffness and loss of strength. Symptoms are exacerbated by ascending stairs, descending stairs, bending/squatting, transfers, walking, running, kneeling and standing  and relieved by rest.  Special tests included:  X-ray (see report).    Work activity restrictions: retired.  Barriers include:  None as reported by patient.                        Objective:        Flexibility/Screens:       Lower Extremity:  Decreased left lower extremity flexibility:Hip Flexors; IT Band; Quadriceps and Hamstrings                                                        Knee Evaluation:  ROM:  Strength wnl knee: weak hip abductors.  AROM    Hyperextension: Left:  0    Right:  Extension: Left: 5    Right:   Flexion: Left: [id number]   Right:  PROM    Hyperextension: Left: 0   Right:   Extension: Left: 0   Right:    Flexion: Left: 130   Right:       Strength:     Extension:  Left: 4+/5   Pain:        Flexion:  Left: 5/5   Pain:                Palpation:    Left knee tenderness present at:  Medial Joint Line    Edema:  Edema of the knee: mild left knee.      Functional Testing:  : fair balance left lower extremity.                  General     ROS    Assessment/Plan:    Patient is a 71 year old male with left side knee complaints.    Patient has the following significant findings with corresponding treatment plan.                Diagnosis 1:  Left knee OA  Pain -  hot/cold therapy, self management, education and home program  Decreased ROM/flexibility - therapeutic exercise, therapeutic activity and home program  Decreased strength - therapeutic exercise, therapeutic activities and home program    Therapy Evaluation Codes:   1) History comprised of:   Personal factors that impact the plan of care:      None.    Comorbidity factors that impact the plan of care are:      High blood pressure, Weakness and osteoporosis.     Medications impacting care: None.  2) Examination of Body Systems comprised of:   Body structures and functions that impact the plan of care:      Knee.   Activity limitations that impact the plan of care are:      Running, Sports, Squatting/kneeling, Stairs and Walking.  3) Clinical presentation characteristics are:   Stable/Uncomplicated.  4) Decision-Making    Low complexity using standardized patient assessment instrument and/or measureable assessment of functional outcome.  Cumulative Therapy Evaluation is: Low complexity.    Previous and current functional limitations:  (See Goal Flow Sheet for this information)    Short term and Long term goals: (See Goal Flow Sheet for this information)     Communication ability:  Patient appears to be able to clearly communicate and understand verbal and written communication and follow directions correctly.  Treatment Explanation - The following has been discussed  with the patient:   RX ordered/plan of care  Anticipated outcomes  Possible risks and side effects  This patient would benefit from PT intervention to resume normal activities.   Rehab potential is good.    Frequency:  1 X week, once daily  Duration:  for 6 weeks  Discharge Plan:  Achieve all LTG.  Independent in home treatment program.  Reach maximal therapeutic benefit.    Please refer to the daily flowsheet for treatment today, total treatment time and time spent performing 1:1 timed codes.

## 2021-10-12 ENCOUNTER — MYC MEDICAL ADVICE (OUTPATIENT)
Dept: FAMILY MEDICINE | Facility: CLINIC | Age: 71
End: 2021-10-12

## 2021-10-14 NOTE — TELEPHONE ENCOUNTER
Summary:    Patient is due/failing the following:   Flu vaccine    Reviewed:  [] CARE EVERYWHERE  [] LAST OV NOTE INCLUDING ENDO  [] FYI TAB  [] MYCHART ACTIVE?  [] LAST PANEL ENCOUNTER  [] FUTURE APPTS  [] IMMUNIZATIONS          Action needed:   Patient needs nurse only appointment.    Type of outreach:    Sent [employer] message.                                                                               Elaina Lynn/LAUREANO  [place]---[facility]

## 2021-10-20 ENCOUNTER — TELEPHONE (OUTPATIENT)
Dept: UROLOGY | Facility: CLINIC | Age: 71
End: 2021-10-20

## 2021-10-20 ENCOUNTER — VIRTUAL VISIT (OUTPATIENT)
Dept: UROLOGY | Facility: CLINIC | Age: 71
End: 2021-10-20
Attending: FAMILY MEDICINE
Payer: COMMERCIAL

## 2021-10-20 VITALS — BODY MASS INDEX: 27.4 KG/M2 | HEIGHT: 69 IN | WEIGHT: 185 LBS

## 2021-10-20 DIAGNOSIS — R35.1 BENIGN PROSTATIC HYPERPLASIA WITH NOCTURIA: ICD-10-CM

## 2021-10-20 DIAGNOSIS — R35.1 BENIGN PROSTATIC HYPERPLASIA WITH NOCTURIA: Primary | ICD-10-CM

## 2021-10-20 DIAGNOSIS — N52.9 ERECTILE DYSFUNCTION, UNSPECIFIED ERECTILE DYSFUNCTION TYPE: ICD-10-CM

## 2021-10-20 DIAGNOSIS — N44.2 BENIGN CYST OF TESTIS: ICD-10-CM

## 2021-10-20 DIAGNOSIS — N40.1 BENIGN PROSTATIC HYPERPLASIA WITH NOCTURIA: Primary | ICD-10-CM

## 2021-10-20 DIAGNOSIS — N40.1 BENIGN PROSTATIC HYPERPLASIA WITH NOCTURIA: ICD-10-CM

## 2021-10-20 PROCEDURE — 99204 OFFICE O/P NEW MOD 45 MIN: CPT | Mod: 95 | Performed by: STUDENT IN AN ORGANIZED HEALTH CARE EDUCATION/TRAINING PROGRAM

## 2021-10-20 RX ORDER — TADALAFIL 5 MG/1
5 TABLET ORAL EVERY 24 HOURS
Qty: 90 TABLET | Refills: 1 | Status: SHIPPED | OUTPATIENT
Start: 2021-10-20 | End: 2023-01-10

## 2021-10-20 RX ORDER — TADALAFIL 5 MG/1
5 TABLET ORAL EVERY 24 HOURS
Qty: 90 TABLET | Refills: 1 | Status: SHIPPED | OUTPATIENT
Start: 2021-10-20 | End: 2021-10-20

## 2021-10-20 ASSESSMENT — PAIN SCALES - GENERAL: PAINLEVEL: NO PAIN (0)

## 2021-10-20 ASSESSMENT — MIFFLIN-ST. JEOR: SCORE: 1589.53

## 2021-10-20 NOTE — PROGRESS NOTES
*PT WILL MEET YOU IN [employer]*    PT HAS CYSTS ON HIS SCROTUM.  PT THINKS PROSTATE IS ENLARGED.  PT HAS FREQ WITH COFFEE  WHEN PT HOLDS HIS URINE TOO LONG HE HAS PROBLEMS STARTING FLOW.    Ridge is a 71 year old who is being evaluated via a billable video visit.      How would you like to obtain your AVS? [employer]  If the video visit is dropped, the invitation should be resent by: Text to cell phone: [phone]  Will anyone else be joining your video visit? No      Video Start Time: 09:32  Video-Visit Details    Type of service:  Video Visit    Video End Time:9:48 AM    Originating Location (pt. Location): Home    Distant Location (provider location):  [facility] [place]     Platform used for Video Visit: GPal         Chief Complaint:    LUTS           Consult or Referral:     Mr. Ridge Hutchison is a 71 year old male seen at the request of Dr. Mehta.         History of Present Illness:    Ridge Hutchison is a very pleasant 71 year old male who presents with a history of LUTS.      Reviewed previous notes from Dr. mehta  Ridge presents today with a history of progressively worsening lower urine tract symptoms.  He does complain of decreased flow, hesitancy, nocturia, urgency and frequency of urination.  He says that he has the symptoms almost every time of the day and has some bothersome issues with that.  If he were to start working full-time as he was a couple of years ago then it would be significantly bothering his lifestyle.  He also notes that he has couple of cysts in his scrotum which have been there for a long time there has not been a progressive change in size of these in previous evaluations by other doctors have told him to continue watching these cysts as they do not need any intervention.  He is concerned by the fact that they might turn cancerous and wants our opinion on that regard.  He also has some issues with erections lately and he feels that he is not being able  to get good enough erections.  His cholesterol levels were on the higher side and he has been prescribed statins by his primary care but has not taken those yet.  He was also prescribed Viagra for his ED but has not taken it.             Past Medical History:     Past Medical History:   Diagnosis Date     Arthritis     Big left toe     Atypical nevus 2014     Do you wish to do the replacement in the background? yes       Blepharospasm of left eye 2021     Bunion, left 2021     Essential hypertension 2021     Hyperlipidemia with target LDL less than 130 2010     Diagnosis updated by automated process. Provider to review and confirm.     Hypertension     Need to have checked     Left knee pain 2021     Neurofibroma 2021     Oropharyngeal dysphagia 2021     Overweight 2021     Papule 2021     Primary insomnia 2021     Pure hypercholesterolemia      Seborrheic keratosis 2021     Spider veins      Urinary urgency 2021            Past Surgical History:     Past Surgical History:   Procedure Laterality Date     DENTAL SURGERY      wisdom teeth removal     HC REMOVAL OF TONSILS,<11 Y/O       SOFT TISSUE SURGERY      egg sized fibroid tumor on side of right calf     ZZC NONSPECIFIC PROCEDURE      right leg fibroid tumor removal            Medications     Current Outpatient Medications   Medication     tadalafil (CIALIS) 5 MG tablet     aspirin 325 MG tablet     Flaxseed, Linseed, (GNP FLAXSEED) 1000 MG CAPS     Ibuprofen (ADVIL PO)     Multiple Vitamin (MULTI VITAMIN MENS PO)     rosuvastatin (CRESTOR) 40 MG tablet     sildenafil (VIAGRA) 100 MG tablet     UNABLE TO FIND     No current facility-administered medications for this visit.            Family History:     Family History   Problem Relation Age of Onset     C.A.D. Father      Hypertension Father      Eye Disorder Father         macular degeneration     Heart Failure Father          age 90      Macular Degeneration Father      Genitourinary Problems Mother         bladder falling     Dementia Mother          age 93     Osteoporosis Mother      Heart Disease Maternal Grandmother          from heart disease,     Cancer Paternal Grandfather         bone     C.A.D. Paternal Grandmother      Arthritis Sister         knee replacements due to arthritis     Hypertension Sister      Glaucoma Sister      Multiple Sclerosis Sister      Crohn's Disease Son             Social History:     Social History     Socioeconomic History     Marital status:      Spouse name: Not on file     Number of children: 2     Years of education: Not on file     Highest education level: Not on file   Occupational History     Occupation:      Comment: [employer]   Tobacco Use     Smoking status: Never Smoker     Smokeless tobacco: Never Used   Vaping Use     Vaping Use: Never used   Substance and Sexual Activity     Alcohol use: Not Currently     Comment: One beer every 3 months.     Drug use: Never     Sexual activity: Not Currently     Partners: Female   Other Topics Concern     Parent/sibling w/ CABG, MI or angioplasty before 65F 55M? No   Social History Narrative    Live alone.   x 1.     Social Determinants of Health     Financial Resource Strain:      Difficulty of Paying Living Expenses:    Food Insecurity:      Worried About Running Out of Food in the Last Year:      Ran Out of Food in the Last Year:    Transportation Needs:      Lack of Transportation (Medical):      Lack of Transportation (Non-Medical):    Physical Activity:      Days of Exercise per Week:      Minutes of Exercise per Session:    Stress:      Feeling of Stress :    Social Connections:      Frequency of Communication with Friends and Family:      Frequency of Social Gatherings with Friends and Family:      Attends Ep[employer] Services:      Active Member of Clubs or Organizations:      Attends Club or  Organization Meetings:      Marital Status:    Intimate Partner Violence:      Fear of Current or Ex-Partner:      Emotionally Abused:      Physically Abused:      Sexually Abused:             Allergies:   Patient has no known allergies.         Review of Systems:  From intake questionnaire     Skin: negative  Eyes: negative  Ears/Nose/Throat: negative  Respiratory: No shortness of breath, dyspnea on exertion, cough, or hemoptysis  Cardiovascular: No chest pain or palpitations  Gastrointestinal: negative; no nausea/vomiting, constipation or diarrhea  Genitourinary: as per HPI  Musculoskeletal: negative  Neurologic: negative  Psychiatric: negative  Hematologic/Lymphatic/Immunologic: negative  Endocrine: negative         Physical Exam:   This is a virtual visit    Alert, no acute distress, oriented, conversant    Ears/nose/mouth: mouth:normal, good dentition  Respiratory: no respiratory distress, or pursed lip breathing  Cardiovascular:no obvious jugular venous distension present  Skin: no suspicious lesions or rashes on Visible body parts on the Screen  Neuro: Alert, oriented, speech and mentation normal  Psych: affect and mood normal, alert and oriented to person, place and time      Labs and Pathology:    The following labs were reviewed by me and discussed with the patient:    Significant for   Lab Results   Component Value Date    CR 1.06 08/02/2021    CR 0.97 07/10/2018     PSA   Date Value Ref Range Status   08/03/2020 2.28 0 - 4 ug/L Final     Comment:     Assay Method:  Chemiluminescence using Siemens Vista analyzer   07/16/2019 2.67 0 - 4 ug/L Final     Comment:     Assay Method:  Chemiluminescence using Siemens Vista analyzer   07/10/2018 2.36 0 - 4 ug/L Final     Comment:     Assay Method:  Chemiluminescence using Siemens Vista analyzer   06/28/2017 2.30 0 - 4 ug/L Final     Comment:     Assay Method:  Chemiluminescence using Siemens Vista analyzer   05/13/2004 1.12 0 - 4 ug/L Final     Prostate Specific  Antigen Screen   Date Value Ref Range Status   08/02/2021 2.45 0.00 - 4.00 ug/L Final     Urine analysis: Normal        Imaging:    The following imaging exams were independently viewed and interpreted by me and discussed with patient:  Scrotal ultrasound from 2003: Normal         Assessment and Plan:     Assessment:     Benign prostatic hyperplasia with nocturia  Discussed about the significance of following tract symptoms and the relationship of age prostate and the bladder in the mechanism of causation.  Since he also has some erectile dysfunction we discussed about different options of medical management which include alpha 1 inhibitors, TRACE's, or the drugs like Cialis which have possibly beneficial role for Ridge in view of his dual symptoms of urinary tract issues as well as erectile dysfunction.  We will see him back in 6 months to reassess his symptomatic improvement  - Adult Urology Referral  - tadalafil (CIALIS) 5 MG tablet; Take 1 tablet (5 mg) by mouth every 24 hours    Erectile dysfunction, unspecified erectile dysfunction type  Cialis on regular basis should help him with his erection issues discussed about follow-up in 6 months  - tadalafil (CIALIS) 5 MG tablet; Take 1 tablet (5 mg) by mouth every 24 hours    Benign cyst of testis  Due to limitation of the visit today I could not examine his testes but ultrasound from 2003 was available for review which did not show anything that was concerning.  He also notes that the size of the cysts are not increasing.  I recommended that reassessment at the time of follow-up or ultrasound at this moment might be the ideal options for assessing that.  He was agreeable to reassessment at 6 months after our visit      Plan:  Review in 6 months    Orders  No orders of the defined types were placed in this encounter.        Ben Morales MD  [facility] [place]                  ==========================    Additional Billing and Coding  Information:  Review of external notes as documented above   Review of the result(s) of each unique test - UA, creatinine, PSA, scrotal ultrasound    Independent interpretation of a test performed by another physician/other qualified health care professional (not separately reported) -       Discussion of management or test interpretation with external physician/other qualified healthcare professional/appropriate source -       Diagnosis or treatment significantly limited by social determinants of health -       30 minutes spent on the date of the encounter doing chart review, review of test results, interpretation of tests, patient visit and documentation     ==========================

## 2021-10-20 NOTE — LETTER
10/20/2021       RE: Ridge Hutchison  [address]  [place] [place] [facility] [address]     Dear Colleague,    Thank you for referring your patient, Ridge Hutchison, to the [facility] [place] at [facility]. Please see a copy of my visit note below.    *PT WILL MEET YOU IN [employer]*    PT HAS CYSTS ON HIS SCROTUM.  PT THINKS PROSTATE IS ENLARGED.  PT HAS FREQ WITH COFFEE  WHEN PT HOLDS HIS URINE TOO LONG HE HAS PROBLEMS STARTING FLOW.    Ridge is a 71 year old who is being evaluated via a billable video visit.      How would you like to obtain your AVS? MyChart  If the video visit is dropped, the invitation should be resent by: Text to cell phone: [phone]  Will anyone else be joining your video visit? No      Video Start Time: 09:32  Video-Visit Details    Type of service:  Video Visit    Video End Time:9:48 AM    Originating Location (pt. Location): Home    Distant Location (provider location):  [facility] [place]     Platform used for Video Visit: Channel M         Chief Complaint:    LUTS           Consult or Referral:     Mr. Ridge Hutchison is a 71 year old male seen at the request of Dr. Mehta.         History of Present Illness:    Ridge Hutchison is a very pleasant 71 year old male who presents with a history of LUTS.      Reviewed previous notes from Dr. mehta  Ridge presents today with a history of progressively worsening lower urine tract symptoms.  He does complain of decreased flow, hesitancy, nocturia, urgency and frequency of urination.  He says that he has the symptoms almost every time of the day and has some bothersome issues with that.  If he were to start working full-time as he was a couple of years ago then it would be significantly bothering his lifestyle.  He also notes that he has couple of cysts in his scrotum which have been there for a long time there has not been a progressive change in size of  these in previous evaluations by other doctors have told him to continue watching these cysts as they do not need any intervention.  He is concerned by the fact that they might turn cancerous and wants our opinion on that regard.  He also has some issues with erections lately and he feels that he is not being able to get good enough erections.  His cholesterol levels were on the higher side and he has been prescribed statins by his primary care but has not taken those yet.  He was also prescribed Viagra for his ED but has not taken it.             Past Medical History:     Past Medical History:   Diagnosis Date     Arthritis 2014    Big left toe     Atypical nevus 7/9/2014     Do you wish to do the replacement in the background? yes       Blepharospasm of left eye 8/2/2021     Bunion, left 8/2/2021     Essential hypertension 8/16/2021     Hyperlipidemia with target LDL less than 130 7/7/2010     Diagnosis updated by automated process. Provider to review and confirm.     Hypertension     Need to have checked     Left knee pain 8/2/2021     Neurofibroma 8/19/2021     Oropharyngeal dysphagia 8/2/2021     Overweight 8/2/2021     Papule 8/2/2021     Primary insomnia 8/2/2021     Pure hypercholesterolemia      Seborrheic keratosis 8/2/2021     Spider veins      Urinary urgency 8/2/2021            Past Surgical History:     Past Surgical History:   Procedure Laterality Date     DENTAL SURGERY      wisdom teeth removal     HC REMOVAL OF TONSILS,<14 Y/O       SOFT TISSUE SURGERY  1995    egg sized fibroid tumor on side of right calf     ZZC NONSPECIFIC PROCEDURE  1995    right leg fibroid tumor removal            Medications     Current Outpatient Medications   Medication     tadalafil (CIALIS) 5 MG tablet     aspirin 325 MG tablet     Flaxseed, Linseed, (GNP FLAXSEED) 1000 MG CAPS     Ibuprofen (ADVIL PO)     Multiple Vitamin (MULTI VITAMIN MENS PO)     rosuvastatin (CRESTOR) 40 MG tablet     sildenafil (VIAGRA) 100 MG  tablet     UNABLE TO FIND     No current facility-administered medications for this visit.            Family History:     Family History   Problem Relation Age of Onset     C.A.D. Father      Hypertension Father      Eye Disorder Father         macular degeneration     Heart Failure Father          age 90     Macular Degeneration Father      Genitourinary Problems Mother         bladder falling     Dementia Mother          age 93     Osteoporosis Mother      Heart Disease Maternal Grandmother          from heart disease,     Cancer Paternal Grandfather         bone     C.A.D. Paternal Grandmother      Arthritis Sister         knee replacements due to arthritis     Hypertension Sister      Glaucoma Sister      Multiple Sclerosis Sister      Crohn's Disease Son             Social History:     Social History     Socioeconomic History     Marital status:      Spouse name: Not on file     Number of children: 2     Years of education: Not on file     Highest education level: Not on file   Occupational History     Occupation:      Comment: [employer]   Tobacco Use     Smoking status: Never Smoker     Smokeless tobacco: Never Used   Vaping Use     Vaping Use: Never used   Substance and Sexual Activity     Alcohol use: Not Currently     Comment: One beer every 3 months.     Drug use: Never     Sexual activity: Not Currently     Partners: Female   Other Topics Concern     Parent/sibling w/ CABG, MI or angioplasty before 65F 55M? No   Social History Narrative    Live alone.   x 1.     Social Determinants of Health     Financial Resource Strain:      Difficulty of Paying Living Expenses:    Food Insecurity:      Worried About Running Out of Food in the Last Year:      Ran Out of Food in the Last Year:    Transportation Needs:      Lack of Transportation (Medical):      Lack of Transportation (Non-Medical):    Physical Activity:      Days of Exercise per Week:       Minutes of Exercise per Session:    Stress:      Feeling of Stress :    Social Connections:      Frequency of Communication with Friends and Family:      Frequency of Social Gatherings with Friends and Family:      Attends Catholic Services:      Active Member of Clubs or Organizations:      Attends Club or Organization Meetings:      Marital Status:    Intimate Partner Violence:      Fear of Current or Ex-Partner:      Emotionally Abused:      Physically Abused:      Sexually Abused:             Allergies:   Patient has no known allergies.         Review of Systems:  From intake questionnaire     Skin: negative  Eyes: negative  Ears/Nose/Throat: negative  Respiratory: No shortness of breath, dyspnea on exertion, cough, or hemoptysis  Cardiovascular: No chest pain or palpitations  Gastrointestinal: negative; no nausea/vomiting, constipation or diarrhea  Genitourinary: as per HPI  Musculoskeletal: negative  Neurologic: negative  Psychiatric: negative  Hematologic/Lymphatic/Immunologic: negative  Endocrine: negative         Physical Exam:   This is a virtual visit    Alert, no acute distress, oriented, conversant    Ears/nose/mouth: mouth:normal, good dentition  Respiratory: no respiratory distress, or pursed lip breathing  Cardiovascular:no obvious jugular venous distension present  Skin: no suspicious lesions or rashes on Visible body parts on the Screen  Neuro: Alert, oriented, speech and mentation normal  Psych: affect and mood normal, alert and oriented to person, place and time      Labs and Pathology:    The following labs were reviewed by me and discussed with the patient:    Significant for   Lab Results   Component Value Date    CR 1.06 08/02/2021    CR 0.97 07/10/2018     PSA   Date Value Ref Range Status   08/03/2020 2.28 0 - 4 ug/L Final     Comment:     Assay Method:  Chemiluminescence using Siemens Vista analyzer   07/16/2019 2.67 0 - 4 ug/L Final     Comment:     Assay Method:  Chemiluminescence using  Siemens [place] analyzer   07/10/2018 2.36 0 - 4 ug/L Final     Comment:     Assay Method:  Chemiluminescence using Siemens Vista analyzer   06/28/2017 2.30 0 - 4 ug/L Final     Comment:     Assay Method:  Chemiluminescence using Siemens Vista analyzer   05/13/2004 1.12 0 - 4 ug/L Final     Prostate Specific Antigen Screen   Date Value Ref Range Status   08/02/2021 2.45 0.00 - 4.00 ug/L Final     Urine analysis: Normal        Imaging:    The following imaging exams were independently viewed and interpreted by me and discussed with patient:  Scrotal ultrasound from 2003: Normal         Assessment and Plan:     Assessment:     Benign prostatic hyperplasia with nocturia  Discussed about the significance of following tract symptoms and the relationship of age prostate and the bladder in the mechanism of causation.  Since he also has some erectile dysfunction we discussed about different options of medical management which include alpha 1 inhibitors, TRACE's, or the drugs like Cialis which have possibly beneficial role for Ridge in view of his dual symptoms of urinary tract issues as well as erectile dysfunction.  We will see him back in 6 months to reassess his symptomatic improvement  - Adult Urology Referral  - tadalafil (CIALIS) 5 MG tablet; Take 1 tablet (5 mg) by mouth every 24 hours    Erectile dysfunction, unspecified erectile dysfunction type  Cialis on regular basis should help him with his erection issues discussed about follow-up in 6 months  - tadalafil (CIALIS) 5 MG tablet; Take 1 tablet (5 mg) by mouth every 24 hours    Benign cyst of testis  Due to limitation of the visit today I could not examine his testes but ultrasound from 2003 was available for review which did not show anything that was concerning.  He also notes that the size of the cysts are not increasing.  I recommended that reassessment at the time of follow-up or ultrasound at this moment might be the ideal options for assessing that.  He was  agreeable to reassessment at 6 months after our visit      Plan:  Review in 6 months    Orders  No orders of the defined types were placed in this encounter.        Ben Morales MD  [facility] [place]                  ==========================    Additional Billing and Coding Information:  Review of external notes as documented above   Review of the result(s) of each unique test - UA, creatinine, PSA, scrotal ultrasound    Independent interpretation of a test performed by another physician/other qualified health care professional (not separately reported) -       Discussion of management or test interpretation with external physician/other qualified healthcare professional/appropriate source -       Diagnosis or treatment significantly limited by social determinants of health -       30 minutes spent on the date of the encounter doing chart review, review of test results, interpretation of tests, patient visit and documentation     ==========================

## 2021-10-21 ENCOUNTER — TELEPHONE (OUTPATIENT)
Dept: UROLOGY | Facility: CLINIC | Age: 71
End: 2021-10-21

## 2021-10-21 NOTE — TELEPHONE ENCOUNTER
Central Prior Authorization Team   Phone: [phone]      PA Initiation    Medication: tadalafil (CIALIS) 5 MG tablet  Insurance Company: [employer] [place] - Phone [phone] Fax [phone]  Pharmacy Filling the Rx: [id number] [place], [place] - [address]  Filling Pharmacy Phone: [phone]  Filling Pharmacy Fax:    Start Date: 10/21/2021

## 2021-10-21 NOTE — TELEPHONE ENCOUNTER
----- Message from Diana Villa sent at 10/20/2021  3:22 PM CDT -----  Return in about 6 months (around 4/20/2022) in person follow up     JEREMY

## 2021-10-28 NOTE — TELEPHONE ENCOUNTER
PRIOR AUTHORIZATION DENIED    Medication: tadalafil (CIALIS) 5 MG tablet    Denial Date: 10/22/2021    Denial Rational:           Appeal Information:

## 2021-12-09 PROBLEM — M17.12 OSTEOARTHROSIS, LOCALIZED, PRIMARY, KNEE, LEFT: Status: RESOLVED | Noted: 2021-10-07 | Resolved: 2021-12-09

## 2022-03-06 ENCOUNTER — MYC MEDICAL ADVICE (OUTPATIENT)
Dept: FAMILY MEDICINE | Facility: CLINIC | Age: 72
End: 2022-03-06
Payer: COMMERCIAL

## 2022-05-26 ENCOUNTER — MYC MEDICAL ADVICE (OUTPATIENT)
Dept: FAMILY MEDICINE | Facility: CLINIC | Age: 72
End: 2022-05-26
Payer: COMMERCIAL

## 2022-08-02 ENCOUNTER — MYC MEDICAL ADVICE (OUTPATIENT)
Dept: FAMILY MEDICINE | Facility: CLINIC | Age: 72
End: 2022-08-02

## 2022-08-02 DIAGNOSIS — H91.93 HEARING DECREASED, BILATERAL: Primary | ICD-10-CM

## 2022-08-04 ENCOUNTER — OFFICE VISIT (OUTPATIENT)
Dept: OPTOMETRY | Facility: CLINIC | Age: 72
End: 2022-08-04
Payer: COMMERCIAL

## 2022-08-04 DIAGNOSIS — H52.4 PRESBYOPIA: ICD-10-CM

## 2022-08-04 DIAGNOSIS — H26.9 BILATERAL INCIPIENT CATARACTS: ICD-10-CM

## 2022-08-04 DIAGNOSIS — H52.13 MYOPIA OF BOTH EYES WITH ASTIGMATISM: Primary | ICD-10-CM

## 2022-08-04 DIAGNOSIS — H52.203 MYOPIA OF BOTH EYES WITH ASTIGMATISM: Primary | ICD-10-CM

## 2022-08-04 PROCEDURE — 92015 DETERMINE REFRACTIVE STATE: CPT | Performed by: OPTOMETRIST

## 2022-08-04 PROCEDURE — 92004 COMPRE OPH EXAM NEW PT 1/>: CPT | Performed by: OPTOMETRIST

## 2022-08-04 ASSESSMENT — CONF VISUAL FIELD
METHOD: COUNTING FINGERS
OD_NORMAL: 1
OS_NORMAL: 1

## 2022-08-04 ASSESSMENT — REFRACTION_MANIFEST
OD_SPHERE: -1.00
OS_AXIS: 008
OS_CYLINDER: +1.25
OS_ADD: +2.50
OS_AXIS: 003
OD_ADD: +2.50
OD_SPHERE: -1.00
OD_CYLINDER: +0.75
OD_AXIS: 010
OS_CYLINDER: +1.50
OD_AXIS: 020
OD_CYLINDER: +1.00
METHOD_AUTOREFRACTION: 1
OS_SPHERE: -3.25
OS_SPHERE: -3.50

## 2022-08-04 ASSESSMENT — REFRACTION_WEARINGRX
OS_CYLINDER: +1.50
OS_SPHERE: -3.75
SPECS_TYPE: PAL
OD_SPHERE: -1.25
OS_ADD: +2.25
OD_ADD: +2.25
OD_CYLINDER: SPHERE
OS_AXIS: 160

## 2022-08-04 ASSESSMENT — VISUAL ACUITY
CORRECTION_TYPE: GLASSES
OS_CC+: -1
OD_SC+: -2
OD_CC: 20/30-2
OS_SC: 20/200
METHOD: SNELLEN - LINEAR
OD_CC: 20/25
OS_CC: 20/30
OS_SC: 20/20
OS_CC: 20/20
OD_SC: 20/30
OD_SC: 20/60
OD_CC+: -2

## 2022-08-04 ASSESSMENT — CUP TO DISC RATIO
OS_RATIO: 0.35
OD_RATIO: 0.3

## 2022-08-04 ASSESSMENT — SLIT LAMP EXAM - LIDS
COMMENTS: MEIBOMIAN GLAND DYSFUNCTION
COMMENTS: MEIBOMIAN GLAND DYSFUNCTION

## 2022-08-04 ASSESSMENT — TONOMETRY
OD_IOP_MMHG: 16
OS_IOP_MMHG: 18
IOP_METHOD: APPLANATION

## 2022-08-04 ASSESSMENT — EXTERNAL EXAM - RIGHT EYE: OD_EXAM: NORMAL

## 2022-08-04 ASSESSMENT — EXTERNAL EXAM - LEFT EYE: OS_EXAM: NORMAL

## 2022-08-04 NOTE — PROGRESS NOTES
Chief Complaint   Patient presents with     Annual Eye Exam        Last Eye Exam: 2020  Dilated Previously: Yes, side effects of dilation explained today    What are you currently using to see?  glasses       Distance Vision Acuity: Noticed gradual change in both eyes    Near Vision Acuity: Not satisfied     Eye Comfort: good  Do you use eye drops? : No      Kerry Little - Optometric Assistant           Medical, surgical and family histories reviewed and updated 8/4/2022.       OBJECTIVE: See Ophthalmology exam    ASSESSMENT:    ICD-10-CM    1. Myopia of both eyes with astigmatism  H52.13     H52.203    2. Presbyopia  H52.4    3. Bilateral incipient cataracts  H26.9        PLAN:       Em Ayala OD

## 2022-08-04 NOTE — LETTER
8/4/2022         RE: Ridge Hutchison  [address]  [place] [place] [facility] [address]        Dear Colleague,    [facility] Please see a copy of my visit note below.    Chief Complaint   Patient presents with     Annual Eye Exam        Last Eye Exam: 2020  Dilated Previously: Yes, side effects of dilation explained today    What are you currently using to see?  glasses       Distance Vision Acuity: Noticed gradual change in both eyes    Near Vision Acuity: Not satisfied     Eye Comfort: good  Do you use eye drops? : No      Kerry Little - Optometric Assistant           Medical, surgical and family histories reviewed and updated 8/4/2022.       OBJECTIVE: See Ophthalmology exam    ASSESSMENT:    ICD-10-CM    1. Myopia of both eyes with astigmatism  H52.13     H52.203    2. Presbyopia  H52.4    3. Bilateral incipient cataracts  H26.9        PLAN:       Em Ayala OD       Again, thank you for allowing me to participate in the care of your patient.        Sincerely,        Em Ayala, OD

## 2022-08-22 ENCOUNTER — TELEPHONE (OUTPATIENT)
Dept: FAMILY MEDICINE | Facility: CLINIC | Age: 72
End: 2022-08-22

## 2022-08-22 NOTE — TELEPHONE ENCOUNTER
Andrea Sabillon,    I care about your health and have reviewed your health plan. I have reviewed your medical conditions, medication list, and lab results and am making recommendations based on this review, to better manage your health.    You are in particular need of attention regarding:  -Colon Cancer Screening  -Wellness (Physical) Visit     I am recommending that you:  {recommendations:-schedule a WELLNESS (Physical) APPOINTMENT with me.   I will check fasting labs the same day - nothing to eat except water and meds for 8-10 hours prior. (If you go elsewhere for Wellness visits then please disregard this reminder.)      Please call us at [phone] (or use [facility]) to address the above recommendations.     Thank you for trusting [facility] and we appreciate the opportunity to serve you.  We look forward to supporting your healthcare needs in the future.    Healthy Regards,    Erick Burton MD

## 2022-09-27 ENCOUNTER — MYC MEDICAL ADVICE (OUTPATIENT)
Dept: FAMILY MEDICINE | Facility: CLINIC | Age: 72
End: 2022-09-27

## 2022-10-30 ENCOUNTER — HEALTH MAINTENANCE LETTER (OUTPATIENT)
Age: 72
End: 2022-10-30

## 2022-11-07 ENCOUNTER — TRANSFERRED RECORDS (OUTPATIENT)
Dept: HEALTH INFORMATION MANAGEMENT | Facility: CLINIC | Age: 72
End: 2022-11-07

## 2022-11-07 LAB — HEMOCCULT STL QL IA: NORMAL

## 2023-01-03 ASSESSMENT — ENCOUNTER SYMPTOMS
SHORTNESS OF BREATH: 0
CONSTIPATION: 0
SORE THROAT: 0
HEARTBURN: 0
DIZZINESS: 0
DYSURIA: 0
EYE PAIN: 0
PARESTHESIAS: 0
DIARRHEA: 0
MYALGIAS: 0
JOINT SWELLING: 0
ABDOMINAL PAIN: 0
NERVOUS/ANXIOUS: 0
FEVER: 0
HEADACHES: 0
ARTHRALGIAS: 0
HEMATURIA: 0
WEAKNESS: 0
NAUSEA: 0
PALPITATIONS: 0
HEMATOCHEZIA: 0
FREQUENCY: 0
CHILLS: 0
COUGH: 0

## 2023-01-03 ASSESSMENT — ACTIVITIES OF DAILY LIVING (ADL): CURRENT_FUNCTION: NO ASSISTANCE NEEDED

## 2023-01-10 ENCOUNTER — OFFICE VISIT (OUTPATIENT)
Dept: FAMILY MEDICINE | Facility: CLINIC | Age: 73
End: 2023-01-10
Payer: COMMERCIAL

## 2023-01-10 VITALS
WEIGHT: 194 LBS | OXYGEN SATURATION: 99 % | HEART RATE: 58 BPM | BODY MASS INDEX: 28.73 KG/M2 | TEMPERATURE: 98 F | RESPIRATION RATE: 14 BRPM | DIASTOLIC BLOOD PRESSURE: 80 MMHG | HEIGHT: 69 IN | SYSTOLIC BLOOD PRESSURE: 130 MMHG

## 2023-01-10 DIAGNOSIS — Z12.11 SCREEN FOR COLON CANCER: ICD-10-CM

## 2023-01-10 DIAGNOSIS — Z00.00 ENCOUNTER FOR MEDICARE ANNUAL WELLNESS EXAM: Primary | ICD-10-CM

## 2023-01-10 DIAGNOSIS — E78.5 HYPERLIPIDEMIA WITH TARGET LDL LESS THAN 130: ICD-10-CM

## 2023-01-10 DIAGNOSIS — L60.0 INGROWN TOENAIL OF LEFT FOOT: ICD-10-CM

## 2023-01-10 DIAGNOSIS — L98.491: ICD-10-CM

## 2023-01-10 DIAGNOSIS — Q85.01 NEUROFIBROMATOSIS, PERIPHERAL, NF1 (H): ICD-10-CM

## 2023-01-10 DIAGNOSIS — Z28.39 IMMUNIZATION DEFICIENCY: ICD-10-CM

## 2023-01-10 DIAGNOSIS — R73.9 HYPERGLYCEMIA: ICD-10-CM

## 2023-01-10 LAB
ALBUMIN SERPL BCG-MCNC: 4.6 G/DL (ref 3.5–5.2)
ALP SERPL-CCNC: 98 U/L (ref 40–129)
ALT SERPL W P-5'-P-CCNC: 43 U/L (ref 10–50)
ANION GAP SERPL CALCULATED.3IONS-SCNC: 12 MMOL/L (ref 7–15)
AST SERPL W P-5'-P-CCNC: 30 U/L (ref 10–50)
BASOPHILS # BLD AUTO: 0 10E3/UL (ref 0–0.2)
BASOPHILS NFR BLD AUTO: 0 %
BILIRUB SERPL-MCNC: 0.8 MG/DL
BUN SERPL-MCNC: 21.8 MG/DL (ref 8–23)
CALCIUM SERPL-MCNC: 9.3 MG/DL (ref 8.8–10.2)
CHLORIDE SERPL-SCNC: 101 MMOL/L (ref 98–107)
CHOLEST SERPL-MCNC: 249 MG/DL
CREAT SERPL-MCNC: 1.02 MG/DL (ref 0.67–1.17)
DEPRECATED HCO3 PLAS-SCNC: 24 MMOL/L (ref 22–29)
EOSINOPHIL # BLD AUTO: 0.1 10E3/UL (ref 0–0.7)
EOSINOPHIL NFR BLD AUTO: 2 %
ERYTHROCYTE [DISTWIDTH] IN BLOOD BY AUTOMATED COUNT: 13.4 % (ref 10–15)
GFR SERPL CREATININE-BSD FRML MDRD: 79 ML/MIN/1.73M2
GLUCOSE SERPL-MCNC: 94 MG/DL (ref 70–99)
HBA1C MFR BLD: 5.2 % (ref 0–5.6)
HCT VFR BLD AUTO: 46.2 % (ref 40–53)
HDLC SERPL-MCNC: 49 MG/DL
HGB BLD-MCNC: 15.4 G/DL (ref 13.3–17.7)
LDLC SERPL CALC-MCNC: 186 MG/DL
LYMPHOCYTES # BLD AUTO: 1.4 10E3/UL (ref 0.8–5.3)
LYMPHOCYTES NFR BLD AUTO: 21 %
MCH RBC QN AUTO: 30.1 PG (ref 26.5–33)
MCHC RBC AUTO-ENTMCNC: 33.3 G/DL (ref 31.5–36.5)
MCV RBC AUTO: 90 FL (ref 78–100)
MONOCYTES # BLD AUTO: 0.7 10E3/UL (ref 0–1.3)
MONOCYTES NFR BLD AUTO: 11 %
NEUTROPHILS # BLD AUTO: 4.2 10E3/UL (ref 1.6–8.3)
NEUTROPHILS NFR BLD AUTO: 66 %
NONHDLC SERPL-MCNC: 200 MG/DL
PLATELET # BLD AUTO: 196 10E3/UL (ref 150–450)
POTASSIUM SERPL-SCNC: 4.2 MMOL/L (ref 3.4–5.3)
PROT SERPL-MCNC: 7 G/DL (ref 6.4–8.3)
RBC # BLD AUTO: 5.11 10E6/UL (ref 4.4–5.9)
SODIUM SERPL-SCNC: 137 MMOL/L (ref 136–145)
TRIGL SERPL-MCNC: 70 MG/DL
WBC # BLD AUTO: 6.4 10E3/UL (ref 4–11)

## 2023-01-10 PROCEDURE — 80061 LIPID PANEL: CPT | Performed by: FAMILY MEDICINE

## 2023-01-10 PROCEDURE — 85025 COMPLETE CBC W/AUTO DIFF WBC: CPT | Performed by: FAMILY MEDICINE

## 2023-01-10 PROCEDURE — 36415 COLL VENOUS BLD VENIPUNCTURE: CPT | Performed by: FAMILY MEDICINE

## 2023-01-10 PROCEDURE — 80053 COMPREHEN METABOLIC PANEL: CPT | Performed by: FAMILY MEDICINE

## 2023-01-10 PROCEDURE — 99213 OFFICE O/P EST LOW 20 MIN: CPT | Mod: 25 | Performed by: FAMILY MEDICINE

## 2023-01-10 PROCEDURE — 83036 HEMOGLOBIN GLYCOSYLATED A1C: CPT | Performed by: FAMILY MEDICINE

## 2023-01-10 PROCEDURE — G0439 PPPS, SUBSEQ VISIT: HCPCS | Performed by: FAMILY MEDICINE

## 2023-01-10 ASSESSMENT — ENCOUNTER SYMPTOMS
HEARTBURN: 0
JOINT SWELLING: 0
CONSTIPATION: 0
DYSURIA: 0
NAUSEA: 0
DIARRHEA: 0
MYALGIAS: 0
WEAKNESS: 0
SORE THROAT: 0
NERVOUS/ANXIOUS: 0
PALPITATIONS: 0
HEMATURIA: 0
DIZZINESS: 0
HEADACHES: 0
SHORTNESS OF BREATH: 0
ABDOMINAL PAIN: 0
ROS SKIN COMMENTS: AS DESCRIBED
HEMATOCHEZIA: 0
ARTHRALGIAS: 0
CHILLS: 0
COUGH: 0
EYE PAIN: 0
PARESTHESIAS: 0
FREQUENCY: 0
FEVER: 0

## 2023-01-10 ASSESSMENT — ACTIVITIES OF DAILY LIVING (ADL): CURRENT_FUNCTION: NO ASSISTANCE NEEDED

## 2023-01-10 NOTE — ASSESSMENT & PLAN NOTE
He has stopped his statin, prefers to reduce his cholesterol below 200 through dietary manipulation

## 2023-01-10 NOTE — PROGRESS NOTES
"The 10-year ASCVD risk score (Dandy GONZALEZ, et al., 2019) is: 21.4%    Values used to calculate the score:      Age: 72 years      Sex: Male      Is Non- : No      Diabetic: No      Tobacco smoker: No      Systolic Blood Pressure: 130 mmHg      Is BP treated: No      HDL Cholesterol: 48 mg/dL      Total Cholesterol: 231 mg/dL    Answers for HPI/ROS submitted by the patient on 1/3/2023  In general, how would you rate your overall physical health?: good  Frequency of exercise:: 4-5 days/week  Do you usually eat at least 4 servings of fruit and vegetables a day, include whole grains & fiber, and avoid regularly eating high fat or \"junk\" foods? : No  Taking medications regularly:: Yes  Medication side effects:: None  Activities of Daily Living: no assistance needed  Home safety: no safety concerns identified  Hearing Impairment:: no hearing concerns  In the past 6 months, have you been bothered by leaking of urine?: No  abdominal pain: No  Blood in stool: No  Blood in urine: No  chest pain: No  chills: No  congestion: No  constipation: No  cough: No  diarrhea: No  dizziness: No  ear pain: No  eye pain: No  nervous/anxious: No  fever: No  frequency: No  genital sores: No  headaches: No  hearing loss: No  heartburn: No  arthralgias: No  joint swelling: No  peripheral edema: No  mood changes: No  myalgias: No  nausea: No  dysuria: No  palpitations: No  Skin sensation changes: No  sore throat: No  urgency: No  rash: No  shortness of breath: No  visual disturbance: No  weakness: No  impotence: No  penile discharge: No  In general, how would you rate your overall mental or emotional health?: good  Duration of exercise:: 30-45 minutes    Erick Burton MD    "

## 2023-01-10 NOTE — ASSESSMENT & PLAN NOTE
He has been treating this with Clearasil. Exam shows small skin ulcer c/w CA. Recommend stop clearasil. Refer

## 2023-01-10 NOTE — PATIENT INSTRUCTIONS
Patient Education   Personalized Prevention Plan  You are due for the preventive services outlined below.  Your care team is available to assist you in scheduling these services.  If you have already completed any of these items, please share that information with your care team to update in your medical record.  Health Maintenance Due   Topic Date Due     COVID-19 Vaccine (4 - Booster for Moderna series) 02/11/2022     Colorectal Cancer Screening  07/25/2022     Annual Wellness Visit  08/02/2022     ANNUAL REVIEW OF HM ORDERS  08/02/2022     Flu Vaccine (1) Never done

## 2023-01-11 NOTE — RESULT ENCOUNTER NOTE
Cholesterol is increasing, as it does.  Lifestyle is not terribly effective.  Risk of heart attack is 1 and 5.  I would still recommend medicine.  Liver kidney tests are excellent.  No evidence of diabetes  Erick Burton MD

## 2023-01-13 ENCOUNTER — MYC MEDICAL ADVICE (OUTPATIENT)
Dept: FAMILY MEDICINE | Facility: CLINIC | Age: 73
End: 2023-01-13

## 2023-01-13 DIAGNOSIS — L98.491: Primary | ICD-10-CM

## 2023-03-26 ENCOUNTER — MYC MEDICAL ADVICE (OUTPATIENT)
Dept: FAMILY MEDICINE | Facility: CLINIC | Age: 73
End: 2023-03-26
Payer: COMMERCIAL

## 2023-03-27 ENCOUNTER — NURSE TRIAGE (OUTPATIENT)
Dept: FAMILY MEDICINE | Facility: CLINIC | Age: 73
End: 2023-03-27
Payer: COMMERCIAL

## 2023-03-28 NOTE — TELEPHONE ENCOUNTER
March 28, 2023  Me  to Ridge Hutchison    KF      10:47 AM  Andrea Sabillon,     I tried to reach you by phone yesterday but was unsuccessful and do not see you have called back.  Please call [phone] and ask to speak to a triage nurse so we can further discuss this message.     Thanks,     Kelly Mai, RN    Last read by Ridge Hutchison at 12:57 PM on 3/28/2023.

## 2023-03-29 NOTE — TELEPHONE ENCOUNTER
"See Carey response, encounter closed, pt canceled eye appointment 4/3/23    \"Thank you. My eye is better now. I will keep you advised\"    Kristen Dodge RN, BSN  [facility]      "

## 2023-11-04 NOTE — TELEPHONE ENCOUNTER
L/M to call.  Kelly Mai, RN    March 26, 2023  Ridge Hutchison  to [facility] Triage (supporting Erick Burton MD)           1:51 PM  I have a problem.     I have an appointment next Monday, April 3 but I need to see an ophthalmologist  this week.     My right eye has had specs for awhile that I see against the blue tosha. Now I have more lines floating across that I see. I also have some flashes on occasion. I am concerned.     Can you recommend an ophthalmologist in this area who would be able to see me?     My father had a torn retina in his 70s.     Thank you.     Takes Advair, montelukast and as needed albuterol at home   Continue home meds   No signs of exac

## 2023-11-09 DIAGNOSIS — Z12.11 COLON CANCER SCREENING: ICD-10-CM

## 2023-11-23 ENCOUNTER — LAB (OUTPATIENT)
Dept: FAMILY MEDICINE | Facility: CLINIC | Age: 73
End: 2023-11-23
Payer: COMMERCIAL

## 2023-11-23 DIAGNOSIS — Z12.11 COLON CANCER SCREENING: ICD-10-CM

## 2023-12-06 LAB — NONINV COLON CA DNA+OCC BLD SCRN STL QL: POSITIVE

## 2023-12-07 ENCOUNTER — MYC MEDICAL ADVICE (OUTPATIENT)
Dept: FAMILY MEDICINE | Facility: CLINIC | Age: 73
End: 2023-12-07
Payer: COMMERCIAL

## 2023-12-07 DIAGNOSIS — R19.5 POSITIVE COLORECTAL CANCER SCREENING USING COLOGUARD TEST: Primary | ICD-10-CM

## 2023-12-11 ENCOUNTER — TELEPHONE (OUTPATIENT)
Dept: GASTROENTEROLOGY | Facility: CLINIC | Age: 73
End: 2023-12-11
Payer: COMMERCIAL

## 2023-12-11 NOTE — TELEPHONE ENCOUNTER
"Endoscopy Scheduling Screen    Have you had a positive Covid test in the last 14 days?  No    Are you active on MyChart?   Yes    What insurance is in the chart?  Other:  [employer]     Ordering/Referring Provider:     SHARATH DAIGLE      (If ordering provider performs procedure, schedule with ordering provider unless otherwise instructed. )    BMI: Estimated body mass index is 28.65 kg/m  as calculated from the following:    Height as of 1/10/23: 1.753 m (5' 9\").    Weight as of 1/10/23: 88 kg (194 lb).     Sedation Ordered  moderate sedation.   If patient BMI > 50 do not schedule in ASC.    If patient BMI > 45 do not schedule at [facility].    Are you taking methadone or Suboxone?  No    Are you taking any prescription medications for pain 3 or more times per week?   NO - No RN review required.    Do you have a history of malignant hyperthermia or adverse reaction to anesthesia?  No    (Females) Are you currently pregnant?   No     Have you been diagnosed or told you have pulmonary hypertension?   No    Do you have an LVAD?  No    Have you been told you have moderate to severe sleep apnea?  No    Have you been told you have COPD, asthma, or any other lung disease?  No    Do you have any heart conditions?  No     Have you ever had an organ transplant?   No    Have you ever had or are you awaiting a heart or lung transplant?   No    Have you had a stroke or transient ischemic attack (TIA aka \"mini stroke\" in the last 6 months?   No    Have you been diagnosed with or been told you have cirrhosis of the liver?   No    Are you currently on dialysis?   No    Do you need assistance transferring?   No    BMI: Estimated body mass index is 28.65 kg/m  as calculated from the following:    Height as of 1/10/23: 1.753 m (5' 9\").    Weight as of 1/10/23: 88 kg (194 lb).     Is patients BMI > 40 and scheduling location [facility]?  No    Do you take an injectable medication for weight loss or diabetes (excluding insulin)?  No    Do you take the " medication Naltrexone?  No    Do you take blood thinners?  No       Prep   Are you currently on dialysis or do you have chronic kidney disease?  No    Do you have a diagnosis of diabetes?  No    Do you have a diagnosis of cystic fibrosis (CF)?  No    On a regular basis do you go 3 -5 days between bowel movements?  No    BMI > 40?  No    Preferred Pharmacy:      [id number] [place], [place] - [address] [address]  [address] [address] [address]  Phone: [phone] Fax: [phone]      Final Scheduling Details   Colonoscopy prep sent?  Standard MiraLAX    Procedure scheduled  Colonoscopy    Surgeon:  JUAN      Date of procedure:  01/08/2024     Pre-OP / PAC:   No - Not required for this site.    Location  [facility] - Per order.    Sedation   Moderate Sedation - Per order.      Patient Reminders:   You will receive a call from a Nurse to review instructions and health history.  This assessment must be completed prior to your procedure.  Failure to complete the Nurse assessment may result in the procedure being cancelled.      On the day of your procedure, please designate an adult(s) who can drive you home stay with you for the next 24 hours. The medicines used in the exam will make you sleepy. You will not be able to drive.      You cannot take public transportation, ride share services, or non-medical taxi service without a responsible caregiver.  Medical transport services are allowed with the requirement that a responsible caregiver will receive you at your destination.  We require that drivers and caregivers are confirmed prior to your procedure.

## 2024-01-08 ENCOUNTER — HOSPITAL ENCOUNTER (OUTPATIENT)
Facility: CLINIC | Age: 74
Discharge: HOME | End: 2024-01-08
Attending: INTERNAL MEDICINE | Admitting: INTERNAL MEDICINE
Payer: COMMERCIAL

## 2024-01-08 VITALS
OXYGEN SATURATION: 100 % | DIASTOLIC BLOOD PRESSURE: 83 MMHG | HEART RATE: 83 BPM | SYSTOLIC BLOOD PRESSURE: 107 MMHG | WEIGHT: 181 LBS | HEIGHT: 69 IN | BODY MASS INDEX: 26.81 KG/M2 | RESPIRATION RATE: 16 BRPM

## 2024-01-08 LAB — COLONOSCOPY: NORMAL

## 2024-01-08 PROCEDURE — G0500 MOD SEDAT ENDO SERVICE >5YRS: HCPCS | Performed by: INTERNAL MEDICINE

## 2024-01-08 PROCEDURE — 250N000013 HC RX MED GY IP 250 OP 250 PS 637: Performed by: INTERNAL MEDICINE

## 2024-01-08 PROCEDURE — 45378 DIAGNOSTIC COLONOSCOPY: CPT | Performed by: INTERNAL MEDICINE

## 2024-01-08 PROCEDURE — G0105 COLORECTAL SCRN; HI RISK IND: HCPCS | Performed by: INTERNAL MEDICINE

## 2024-01-08 PROCEDURE — 250N000011 HC RX IP 250 OP 636: Performed by: INTERNAL MEDICINE

## 2024-01-08 PROCEDURE — 99153 MOD SED SAME PHYS/QHP EA: CPT | Performed by: INTERNAL MEDICINE

## 2024-01-08 RX ORDER — NALOXONE HYDROCHLORIDE 0.4 MG/ML
0.2 INJECTION, SOLUTION INTRAMUSCULAR; INTRAVENOUS; SUBCUTANEOUS
Status: DISCONTINUED | OUTPATIENT
Start: 2024-01-08 | End: 2024-01-08 | Stop reason: HOSPADM

## 2024-01-08 RX ORDER — SIMETHICONE 40MG/0.6ML
133 SUSPENSION, DROPS(FINAL DOSAGE FORM)(ML) ORAL
Status: COMPLETED | OUTPATIENT
Start: 2024-01-08 | End: 2024-01-08

## 2024-01-08 RX ORDER — NALOXONE HYDROCHLORIDE 0.4 MG/ML
0.4 INJECTION, SOLUTION INTRAMUSCULAR; INTRAVENOUS; SUBCUTANEOUS
Status: DISCONTINUED | OUTPATIENT
Start: 2024-01-08 | End: 2024-01-08 | Stop reason: HOSPADM

## 2024-01-08 RX ORDER — FENTANYL CITRATE 50 UG/ML
50-100 INJECTION, SOLUTION INTRAMUSCULAR; INTRAVENOUS EVERY 5 MIN PRN
Status: DISCONTINUED | OUTPATIENT
Start: 2024-01-08 | End: 2024-01-08 | Stop reason: HOSPADM

## 2024-01-08 RX ORDER — PROCHLORPERAZINE MALEATE 5 MG
5 TABLET ORAL EVERY 6 HOURS PRN
Status: DISCONTINUED | OUTPATIENT
Start: 2024-01-08 | End: 2024-01-08 | Stop reason: HOSPADM

## 2024-01-08 RX ORDER — ONDANSETRON 2 MG/ML
4 INJECTION INTRAMUSCULAR; INTRAVENOUS
Status: DISCONTINUED | OUTPATIENT
Start: 2024-01-08 | End: 2024-01-08 | Stop reason: HOSPADM

## 2024-01-08 RX ORDER — FLUMAZENIL 0.1 MG/ML
0.2 INJECTION, SOLUTION INTRAVENOUS
Status: DISCONTINUED | OUTPATIENT
Start: 2024-01-08 | End: 2024-01-08 | Stop reason: HOSPADM

## 2024-01-08 RX ORDER — EPINEPHRINE 1 MG/ML
0.1 INJECTION, SOLUTION INTRAMUSCULAR; SUBCUTANEOUS
Status: DISCONTINUED | OUTPATIENT
Start: 2024-01-08 | End: 2024-01-08 | Stop reason: HOSPADM

## 2024-01-08 RX ORDER — ONDANSETRON 2 MG/ML
4 INJECTION INTRAMUSCULAR; INTRAVENOUS EVERY 6 HOURS PRN
Status: DISCONTINUED | OUTPATIENT
Start: 2024-01-08 | End: 2024-01-08 | Stop reason: HOSPADM

## 2024-01-08 RX ORDER — DIPHENHYDRAMINE HYDROCHLORIDE 50 MG/ML
25-50 INJECTION INTRAMUSCULAR; INTRAVENOUS
Status: DISCONTINUED | OUTPATIENT
Start: 2024-01-08 | End: 2024-01-08 | Stop reason: HOSPADM

## 2024-01-08 RX ORDER — ONDANSETRON 4 MG/1
4 TABLET, ORALLY DISINTEGRATING ORAL EVERY 6 HOURS PRN
Status: DISCONTINUED | OUTPATIENT
Start: 2024-01-08 | End: 2024-01-08 | Stop reason: HOSPADM

## 2024-01-08 RX ORDER — LIDOCAINE 40 MG/G
CREAM TOPICAL
Status: DISCONTINUED | OUTPATIENT
Start: 2024-01-08 | End: 2024-01-08 | Stop reason: HOSPADM

## 2024-01-08 RX ORDER — ATROPINE SULFATE 0.1 MG/ML
1 INJECTION INTRAVENOUS
Status: DISCONTINUED | OUTPATIENT
Start: 2024-01-08 | End: 2024-01-08 | Stop reason: HOSPADM

## 2024-01-08 RX ADMIN — SIMETHICONE 133 MG: 20 SUSPENSION/ DROPS ORAL at 08:26

## 2024-01-08 RX ADMIN — MIDAZOLAM HYDROCHLORIDE 2 MG: 1 INJECTION, SOLUTION INTRAMUSCULAR; INTRAVENOUS at 08:21

## 2024-01-08 RX ADMIN — FENTANYL CITRATE 100 MCG: 0.05 INJECTION, SOLUTION INTRAMUSCULAR; INTRAVENOUS at 08:17

## 2024-01-08 RX ADMIN — MIDAZOLAM HYDROCHLORIDE 2 MG: 1 INJECTION, SOLUTION INTRAMUSCULAR; INTRAVENOUS at 08:17

## 2024-01-08 ASSESSMENT — ACTIVITIES OF DAILY LIVING (ADL)
ADLS_ACUITY_SCORE: 35
ADLS_ACUITY_SCORE: 35

## 2024-01-08 NOTE — H&P
[facility] Anesthesia Pre-op History and Physical    Ridge Hutchison MRN# [id number]   Age: 73 year old YOB: 1950      Date of Surgery: today [facility]      Date of Exam 1/8/2024 Facility (Same day)       Home clinic: unknown  Primary care provider: Erick Burton         Chief Complaint and/or Reason for Procedure:   No chief complaint on file.           Active problem list:     Patient Active Problem List    Diagnosis Date Noted    Immunization deficiency 01/10/2023     Priority: Medium    Ingrown toenail of left foot 01/10/2023     Priority: Medium    Screen for colon cancer 01/10/2023     Priority: Medium    Skin ulcer of nose, limited to breakdown of skin (H) 01/10/2023     Priority: Medium    Neurofibromatosis, peripheral, NF1 (H) 08/19/2021     Priority: Medium    Essential hypertension 08/16/2021     Priority: Medium    Bunion, left 08/02/2021     Priority: Medium    Urinary urgency 08/02/2021     Priority: Medium    Blepharospasm of left eye 08/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     Priority: Medium    Overweight 08/02/2021     Priority: Medium    Seborrheic keratosis 08/02/2021     Priority: Medium    Neuropathy 08/02/2021     Priority: Medium    Erectile dysfunction due to arterial insufficiency 08/02/2021     Priority: Medium    Special screening for malignant neoplasm of prostate 08/02/2021     Priority: Medium    Unspecified skin changes  08/02/2021     Priority: Medium    Left knee pain 08/02/2021     Priority: Medium    Need for shingles vaccine 08/02/2021     Priority: Medium    Primary insomnia 08/02/2021     Priority: Medium    Oropharyngeal dysphagia 08/02/2021     Priority: Medium    Atypical nevus 07/09/2014     Priority: Medium     Do you wish to do the replacement in the background? yes        Hyperlipidemia with target LDL less than 130 07/07/2010     Priority: Medium     The 10-year ASCVD risk score (Broctonmee MOONEY Jr., et al., 2013) is: 19.3%    Values used to  "calculate the score:      Age: 71 years      Sex: Male      Is Non- : No      Diabetic: No      Tobacco smoker: No      Systolic Blood Pressure: 138 mmHg      Is BP treated: No      HDL Cholesterol: 55 mg/dL      Total Cholesterol: 195 mg/dL                Medications (include herbals and vitamins):   Any Plavix use in the last 7 days? No     Current Facility-Administered Medications   Medication    atropine injection 1 mg    benzocaine 20% (HURRICAINE/TOPEX) 20 % spray 0.5 mL    diphenhydrAMINE (BENADRYL) injection 25-50 mg    EPINEPHrine (Anaphylaxis) (ADRENALIN) injection (vial) 0.1 mg    fentaNYL (PF) (SUBLIMAZE) injection  mcg    flumazenil (ROMAZICON) injection 0.2 mg    glucagon injection 0.5 mg    midazolam (VERSED) injection 0.5-2 mg    naloxone (NARCAN) injection 0.2 mg    Or    naloxone (NARCAN) injection 0.4 mg    Or    naloxone (NARCAN) injection 0.2 mg    Or    naloxone (NARCAN) injection 0.4 mg    simethicone (MYLICON) suspension 133 mg    sodium chloride (PF) 0.9% PF flush 3 mL    sodium chloride 0.9% BOLUS 500 mL             Allergies:    No Known Allergies  Allergy to Latex? No  Allergy to tape?   No  Intolerances: NKDA            Physical Exam:   All vitals have been reviewed  Patient Vitals for the past 8 hrs:   BP Pulse Resp SpO2 Height Weight   01/08/24 0735 -- 92 15 99 % -- --   01/08/24 0730 123/71 87 11 99 % -- --   01/08/24 0727 -- -- -- 99 % 1.753 m (5' 9\") 82.1 kg (181 lb)   01/08/24 0725 132/76 92 16 100 % -- --   01/08/24 0720 133/79 -- -- -- -- --     No intake/output data recorded.  Airway assessment:   Patient is able to open mouth wide  Patient is able to stick out tongue}              Lab / Radiology Results:             Anesthetic risk and/or ASA classification:       Bronson Campa MD      "

## 2024-01-17 ENCOUNTER — MYC MEDICAL ADVICE (OUTPATIENT)
Dept: FAMILY MEDICINE | Facility: CLINIC | Age: 74
End: 2024-01-17
Payer: COMMERCIAL

## 2024-02-12 SDOH — HEALTH STABILITY: PHYSICAL HEALTH: ON AVERAGE, HOW MANY MINUTES DO YOU ENGAGE IN EXERCISE AT THIS LEVEL?: 50 MIN

## 2024-02-12 SDOH — HEALTH STABILITY: PHYSICAL HEALTH: ON AVERAGE, HOW MANY DAYS PER WEEK DO YOU ENGAGE IN MODERATE TO STRENUOUS EXERCISE (LIKE A BRISK WALK)?: 4 DAYS

## 2024-02-12 SDOH — HEALTH STABILITY: PHYSICAL HEALTH: ON AVERAGE, HOW MANY DAYS PER WEEK DO YOU ENGAGE IN MODERATE TO STRENUOUS EXERCISE (LIKE A BRISK WALK)?: 5 DAYS

## 2024-02-12 ASSESSMENT — SOCIAL DETERMINANTS OF HEALTH (SDOH)
HOW OFTEN DO YOU ATTENT MEETINGS OF THE CLUB OR ORGANIZATION YOU BELONG TO?: NEVER
DO YOU BELONG TO ANY CLUBS OR ORGANIZATIONS SUCH AS CHURCH GROUPS UNIONS, FRATERNAL OR ATHLETIC GROUPS, OR SCHOOL GROUPS?: NO
HOW OFTEN DO YOU GET TOGETHER WITH FRIENDS OR RELATIVES?: ONCE A WEEK
HOW OFTEN DO YOU ATTEND CHURCH OR RELIGIOUS SERVICES?: NEVER
HOW OFTEN DO YOU GET TOGETHER WITH FRIENDS OR RELATIVES?: PATIENT DECLINED
IN A TYPICAL WEEK, HOW MANY TIMES DO YOU TALK ON THE PHONE WITH FAMILY, FRIENDS, OR NEIGHBORS?: MORE THAN THREE TIMES A WEEK

## 2024-02-12 ASSESSMENT — LIFESTYLE VARIABLES
HOW OFTEN DO YOU HAVE SIX OR MORE DRINKS ON ONE OCCASION: NEVER
HOW MANY STANDARD DRINKS CONTAINING ALCOHOL DO YOU HAVE ON A TYPICAL DAY: PATIENT DOES NOT DRINK

## 2024-02-19 ENCOUNTER — OFFICE VISIT (OUTPATIENT)
Dept: FAMILY MEDICINE | Facility: CLINIC | Age: 74
End: 2024-02-19
Attending: FAMILY MEDICINE
Payer: COMMERCIAL

## 2024-02-19 VITALS
BODY MASS INDEX: 27.64 KG/M2 | OXYGEN SATURATION: 98 % | HEIGHT: 69 IN | TEMPERATURE: 98 F | SYSTOLIC BLOOD PRESSURE: 130 MMHG | HEART RATE: 77 BPM | RESPIRATION RATE: 18 BRPM | DIASTOLIC BLOOD PRESSURE: 82 MMHG | WEIGHT: 186.6 LBS

## 2024-02-19 DIAGNOSIS — Q85.01 NEUROFIBROMATOSIS, PERIPHERAL, NF1 (H): ICD-10-CM

## 2024-02-19 DIAGNOSIS — Z29.11 NEED FOR VACCINATION AGAINST RESPIRATORY SYNCYTIAL VIRUS: ICD-10-CM

## 2024-02-19 DIAGNOSIS — Z28.39 IMMUNIZATION DEFICIENCY: ICD-10-CM

## 2024-02-19 DIAGNOSIS — E78.5 HYPERLIPIDEMIA WITH TARGET LDL LESS THAN 130: Primary | ICD-10-CM

## 2024-02-19 DIAGNOSIS — I10 ESSENTIAL HYPERTENSION: ICD-10-CM

## 2024-02-19 DIAGNOSIS — L98.491: ICD-10-CM

## 2024-02-19 DIAGNOSIS — Z00.00 WELLNESS EXAMINATION: ICD-10-CM

## 2024-02-19 DIAGNOSIS — H43.813 POSTERIOR VITREOUS DETACHMENT, BILATERAL: ICD-10-CM

## 2024-02-19 PROBLEM — Z23 NEED FOR SHINGLES VACCINE: Status: RESOLVED | Noted: 2021-08-02 | Resolved: 2024-02-19

## 2024-02-19 PROBLEM — E66.3 OVERWEIGHT: Status: RESOLVED | Noted: 2021-08-02 | Resolved: 2024-02-19

## 2024-02-19 LAB
ALBUMIN SERPL BCG-MCNC: 4.7 G/DL (ref 3.5–5.2)
ALP SERPL-CCNC: 96 U/L (ref 40–150)
ALT SERPL W P-5'-P-CCNC: 15 U/L (ref 0–70)
ANION GAP SERPL CALCULATED.3IONS-SCNC: 11 MMOL/L (ref 7–15)
AST SERPL W P-5'-P-CCNC: 18 U/L (ref 0–45)
BASOPHILS # BLD AUTO: 0 10E3/UL (ref 0–0.2)
BASOPHILS NFR BLD AUTO: 0 %
BILIRUB SERPL-MCNC: 0.8 MG/DL
BUN SERPL-MCNC: 15.6 MG/DL (ref 8–23)
CALCIUM SERPL-MCNC: 9.6 MG/DL (ref 8.8–10.2)
CHLORIDE SERPL-SCNC: 101 MMOL/L (ref 98–107)
CHOLEST SERPL-MCNC: 194 MG/DL
CREAT SERPL-MCNC: 1 MG/DL (ref 0.67–1.17)
DEPRECATED HCO3 PLAS-SCNC: 26 MMOL/L (ref 22–29)
EGFRCR SERPLBLD CKD-EPI 2021: 80 ML/MIN/1.73M2
EOSINOPHIL # BLD AUTO: 0.1 10E3/UL (ref 0–0.7)
EOSINOPHIL NFR BLD AUTO: 1 %
ERYTHROCYTE [DISTWIDTH] IN BLOOD BY AUTOMATED COUNT: 12.5 % (ref 10–15)
FASTING STATUS PATIENT QL REPORTED: ABNORMAL
GLUCOSE SERPL-MCNC: 93 MG/DL (ref 70–99)
HCT VFR BLD AUTO: 43.6 % (ref 40–53)
HDLC SERPL-MCNC: 50 MG/DL
HGB BLD-MCNC: 14.9 G/DL (ref 13.3–17.7)
IMM GRANULOCYTES # BLD: 0 10E3/UL
IMM GRANULOCYTES NFR BLD: 0 %
LDLC SERPL CALC-MCNC: 134 MG/DL
LYMPHOCYTES # BLD AUTO: 1.1 10E3/UL (ref 0.8–5.3)
LYMPHOCYTES NFR BLD AUTO: 23 %
MCH RBC QN AUTO: 31.1 PG (ref 26.5–33)
MCHC RBC AUTO-ENTMCNC: 34.2 G/DL (ref 31.5–36.5)
MCV RBC AUTO: 91 FL (ref 78–100)
MONOCYTES # BLD AUTO: 0.5 10E3/UL (ref 0–1.3)
MONOCYTES NFR BLD AUTO: 11 %
NEUTROPHILS # BLD AUTO: 3 10E3/UL (ref 1.6–8.3)
NEUTROPHILS NFR BLD AUTO: 65 %
NONHDLC SERPL-MCNC: 144 MG/DL
PLATELET # BLD AUTO: 178 10E3/UL (ref 150–450)
POTASSIUM SERPL-SCNC: 4.4 MMOL/L (ref 3.4–5.3)
PROT SERPL-MCNC: 7.2 G/DL (ref 6.4–8.3)
RBC # BLD AUTO: 4.79 10E6/UL (ref 4.4–5.9)
SODIUM SERPL-SCNC: 138 MMOL/L (ref 135–145)
TRIGL SERPL-MCNC: 50 MG/DL
TSH SERPL DL<=0.005 MIU/L-ACNC: 1.97 UIU/ML (ref 0.3–4.2)
WBC # BLD AUTO: 4.7 10E3/UL (ref 4–11)

## 2024-02-19 PROCEDURE — 84443 ASSAY THYROID STIM HORMONE: CPT | Performed by: FAMILY MEDICINE

## 2024-02-19 PROCEDURE — G0439 PPPS, SUBSEQ VISIT: HCPCS | Performed by: FAMILY MEDICINE

## 2024-02-19 PROCEDURE — 85025 COMPLETE CBC W/AUTO DIFF WBC: CPT | Performed by: FAMILY MEDICINE

## 2024-02-19 PROCEDURE — 36415 COLL VENOUS BLD VENIPUNCTURE: CPT | Performed by: FAMILY MEDICINE

## 2024-02-19 PROCEDURE — 80053 COMPREHEN METABOLIC PANEL: CPT | Performed by: FAMILY MEDICINE

## 2024-02-19 PROCEDURE — 80061 LIPID PANEL: CPT | Performed by: FAMILY MEDICINE

## 2024-02-19 RX ORDER — RESPIRATORY SYNCYTIAL VIRUS VACCINE 120MCG/0.5
0.5 KIT INTRAMUSCULAR ONCE
Qty: 1 EACH | Refills: 0 | Status: CANCELLED | OUTPATIENT
Start: 2024-02-19 | End: 2024-02-19

## 2024-02-19 ASSESSMENT — ACTIVITIES OF DAILY LIVING (ADL): CURRENT_FUNCTION: NO ASSISTANCE NEEDED

## 2024-02-19 NOTE — PROGRESS NOTES
"Preventive Care Visit  [facility]  Erick Burton MD, Family Medicine  Feb 19, 2024      SUBJECTIVE:   Ridge is a 73 year old, presenting for the following:  Physical      Are you in the first 12 months of your Medicare coverage?  No    Healthy Habits:     In general, how would you rate your overall health?  Good    Frequency of exercise:  4-5 days/week    Duration of exercise:  Greater than 60 minutes    Do you usually eat at least 4 servings of fruit and vegetables a day, include whole grains    & fiber and avoid regularly eating high fat or \"junk\" foods?  Yes    Taking medications regularly:  Yes    Barriers to taking medications:  None    Medication side effects:  None    Ability to successfully perform activities of daily living:  No assistance needed    Home Safety:  No safety concerns identified    Hearing Impairment:  No hearing concerns    In the past 6 months, have you been bothered by leaking of urine?  No    In general, how would you rate your overall mental or emotional health?  Good    Additional concerns today:  Yes      Today's PHQ-2 Score:       2/18/2024     3:20 PM   PHQ-2 ( 1999 Pfizer)   Q1: Little interest or pleasure in doing things 0   Q2: Feeling down, depressed or hopeless 0   PHQ-2 Score 0   Q1: Little interest or pleasure in doing things Not at all   Q2: Feeling down, depressed or hopeless Not at all   PHQ-2 Score 0           Have you ever done Advance Care Planning? (For example, a Health Directive, POLST, or a discussion with a medical provider or your loved ones about your wishes): No, advance care planning information given to patient to review.  Patient declined advance care planning discussion at this time.       Fall risk  Fallen 2 or more times in the past year?: No  click delete button to remove this line now  Cognitive Screening   1) Repeat 3 items (Leader, Season, Table)    2) Clock draw: NORMAL  3) 3 item recall: Recalls 3 objects  Results: 3 " items recalled: COGNITIVE IMPAIRMENT LESS LIKELY    Mini-CogTM Copyright NOEL Buckley. Licensed by the author for use in [facility]; reprinted with permission (keren@[facility]). All rights reserved.      Do you have sleep apnea, excessive snoring or daytime drowsiness? : no    Reviewed and updated as needed this visit by clinical staff   Tobacco  Allergies  Meds              Reviewed and updated as needed this visit by Provider                  Social History     Tobacco Use    Smoking status: Never     Passive exposure: Never    Smokeless tobacco: Never   Substance Use Topics    Alcohol use: Not Currently             2/12/2024    10:47 AM   Alcohol Use   Prescreen: >3 drinks/day or >7 drinks/week? Not Applicable     Do you have a current opioid prescription? No  Do you use any other controlled substances or medications that are not prescribed by a provider? None              Current providers sharing in care for this patient include:   Patient Care Team:  Erick Burton MD as PCP - General (Family Medicine)  Erick Burton MD as Assigned PCP  Ben Morales MD as MD (Urology)  Em Ayala OD as Assigned Surgical Provider    The following health maintenance items are reviewed in Epic and correct as of today:  Health Maintenance   Topic Date Due    RSV VACCINE (Pregnancy & 60+) (1 - 1-dose 60+ series) Never done    INFLUENZA VACCINE (1) Never done    COVID-19 Vaccine (4 - 2023-24 season) 09/01/2023    MEDICARE ANNUAL WELLNESS VISIT  01/10/2024    LIPID  01/10/2024    ANNUAL REVIEW OF HM ORDERS  01/10/2024    EYE EXAM  06/19/2024    DTAP/TDAP/TD IMMUNIZATION (2 - Td or Tdap) 07/09/2024    FALL RISK ASSESSMENT  02/19/2025    GLUCOSE  01/10/2026    ADVANCE CARE PLANNING  01/10/2028    COLORECTAL CANCER SCREENING  01/08/2034    HEPATITIS C SCREENING  Completed    PHQ-2 (once per calendar year)  Completed    Pneumococcal Vaccine: 65+ Years  Completed    ZOSTER IMMUNIZATION  Completed    IPV  "IMMUNIZATION  Aged Out    HPV IMMUNIZATION  Aged Out    MENINGITIS IMMUNIZATION  Aged Out    RSV MONOCLONAL ANTIBODY  Aged Out             Review of Systems     Review of Systems  CONSTITUTIONAL: NEGATIVE for fever, chills, change in weight  ENT/MOUTH: NEGATIVE for ear, mouth and throat problems  RESP: NEGATIVE for significant cough or SOB  CV: NEGATIVE for chest pain, palpitations or peripheral edema  HEME/ALLERGY/IMMUNE: NEGATIVE for bleeding problems    OBJECTIVE:   /82 (BP Location: Right arm, Patient Position: Sitting, Cuff Size: Adult Regular)   Pulse 77   Temp 98  F (36.7  C) (Oral)   Resp 18   Ht 1.753 m (5' 9\")   Wt 84.6 kg (186 lb 9.6 oz)   SpO2 98%   BMI 27.56 kg/m     Estimated body mass index is 27.56 kg/m  as calculated from the following:    Height as of this encounter: 1.753 m (5' 9\").    Weight as of this encounter: 84.6 kg (186 lb 9.6 oz).  Physical Exam  Constitutional:       Appearance: Normal appearance.   HENT:      Head: Atraumatic.      Right Ear: Tympanic membrane normal.      Left Ear: Tympanic membrane normal.      Nose: Nose normal.      Mouth/Throat:      Mouth: Mucous membranes are moist.   Eyes:      Pupils: Pupils are equal, round, and reactive to light.   Cardiovascular:      Rate and Rhythm: Normal rate and regular rhythm.      Heart sounds: Normal heart sounds.   Pulmonary:      Effort: Pulmonary effort is normal.      Breath sounds: Normal breath sounds.   Abdominal:      General: Bowel sounds are normal.      Palpations: Abdomen is soft.   Musculoskeletal:      Cervical back: Neck supple.      Right lower leg: No edema.      Left lower leg: No edema.   Skin:     General: Skin is warm and dry.   Neurological:      General: No focal deficit present.      Mental Status: He is alert.   Psychiatric:         Mood and Affect: Mood normal.               ASSESSMENT / PLAN:     Problem List Items Addressed This Visit       Essential hypertension     Well-controlled.  No meds.  " "Resolution is possible.  Monitor further         Relevant Orders    Comprehensive metabolic panel (BMP + Alb, Alk Phos, ALT, AST, Total. Bili, TP) (Completed)    Hyperlipidemia with target LDL less than 130 - Primary     Statin therapy is indicated, but he prefers lifestyle change.  He hopes to assess and find his cholesterol has been reduced         Relevant Orders    Lipid panel reflex to direct LDL Non-fasting (Completed)    Immunization deficiency     Offered, declined         Need for vaccination against respiratory syncytial virus     Recommended at pharmacy         Neurofibromatosis, peripheral, NF1 (H)     Biopsy confirmation.  He is developing more cutaneous lesions         Posterior vitreous detachment, bilateral     This is his major concern.  He has ongoing ophthalmology care.         RESOLVED: Skin ulcer of nose, limited to breakdown of skin (H)    Wellness examination    Relevant Orders    TSH with free T4 reflex (Completed)    CBC with platelets and differential (Completed)       Patient has been advised of split billing requirements and indicates understanding: Yes      Counseling  Reviewed preventive health counseling, as reflected in patient instructions      BMI  Estimated body mass index is 27.56 kg/m  as calculated from the following:    Height as of this encounter: 1.753 m (5' 9\").    Weight as of this encounter: 84.6 kg (186 lb 9.6 oz).         He reports that he has never smoked. He has never been exposed to tobacco smoke. He has never used smokeless tobacco.      Appropriate preventive services were discussed with this patient, including applicable screening as appropriate for fall prevention, nutrition, physical activity, Tobacco-use cessation, weight loss and cognition.  Checklist reviewing preventive services available has been given to the patient.    Reviewed patients plan of care and provided an AVS. The Basic Care Plan (routine screening as documented in Health Maintenance) for " Ridge meets the Care Plan requirement. This Care Plan has been established and reviewed with the Patient.        Signed Electronically by: Erick Burton MD    Identified Health Risks  I have reviewed Opioid Use Disorder and Substance Use Disorder risk factors and made any needed referrals.

## 2024-02-20 NOTE — ASSESSMENT & PLAN NOTE
Statin therapy is indicated, but he prefers lifestyle change.  He hopes to assess and find his cholesterol has been reduced

## 2024-04-16 ENCOUNTER — TRANSFERRED RECORDS (OUTPATIENT)
Dept: MULTI SPECIALTY CLINIC | Facility: CLINIC | Age: 74
End: 2024-04-16

## 2024-04-16 LAB — RETINOPATHY: NORMAL

## 2025-01-20 ENCOUNTER — PATIENT OUTREACH (OUTPATIENT)
Dept: CARE COORDINATION | Facility: CLINIC | Age: 75
End: 2025-01-20
Payer: COMMERCIAL

## 2025-01-27 ENCOUNTER — OFFICE VISIT (OUTPATIENT)
Dept: INTERNAL MEDICINE | Facility: CLINIC | Age: 75
End: 2025-01-27
Payer: COMMERCIAL

## 2025-01-27 VITALS
OXYGEN SATURATION: 98 % | BODY MASS INDEX: 28.29 KG/M2 | RESPIRATION RATE: 20 BRPM | SYSTOLIC BLOOD PRESSURE: 122 MMHG | WEIGHT: 191 LBS | DIASTOLIC BLOOD PRESSURE: 78 MMHG | HEART RATE: 86 BPM | HEIGHT: 69 IN

## 2025-01-27 DIAGNOSIS — Z11.8 SPECIAL SCREENING EXAMINATION FOR CHLAMYDIAL DISEASE: ICD-10-CM

## 2025-01-27 DIAGNOSIS — N39.0 URINARY TRACT INFECTION WITHOUT HEMATURIA, SITE UNSPECIFIED: ICD-10-CM

## 2025-01-27 DIAGNOSIS — A54.9 GONORRHEA: ICD-10-CM

## 2025-01-27 DIAGNOSIS — N50.811 TESTICULAR PAIN, RIGHT: Primary | ICD-10-CM

## 2025-01-27 DIAGNOSIS — Z12.5 SPECIAL SCREENING FOR MALIGNANT NEOPLASM OF PROSTATE: ICD-10-CM

## 2025-01-27 DIAGNOSIS — N50.89 TESTICULAR LUMP: ICD-10-CM

## 2025-01-27 DIAGNOSIS — I49.9 IRREGULARLY IRREGULAR HEART RHYTHM: ICD-10-CM

## 2025-01-27 LAB
ALBUMIN UR-MCNC: NEGATIVE MG/DL
APPEARANCE UR: CLEAR
BACTERIA #/AREA URNS HPF: ABNORMAL /HPF
BASOPHILS # BLD AUTO: 0 10E3/UL (ref 0–0.2)
BASOPHILS NFR BLD AUTO: 1 %
BILIRUB UR QL STRIP: NEGATIVE
COLOR UR AUTO: YELLOW
EOSINOPHIL # BLD AUTO: 0.1 10E3/UL (ref 0–0.7)
EOSINOPHIL NFR BLD AUTO: 2 %
ERYTHROCYTE [DISTWIDTH] IN BLOOD BY AUTOMATED COUNT: 12.9 % (ref 10–15)
GLUCOSE UR STRIP-MCNC: NEGATIVE MG/DL
HCT VFR BLD AUTO: 42.9 % (ref 40–53)
HGB BLD-MCNC: 14.8 G/DL (ref 13.3–17.7)
HGB UR QL STRIP: NEGATIVE
IMM GRANULOCYTES # BLD: 0 10E3/UL
IMM GRANULOCYTES NFR BLD: 0 %
KETONES UR STRIP-MCNC: ABNORMAL MG/DL
LEUKOCYTE ESTERASE UR QL STRIP: NEGATIVE
LYMPHOCYTES # BLD AUTO: 1.1 10E3/UL (ref 0.8–5.3)
LYMPHOCYTES NFR BLD AUTO: 17 %
MCH RBC QN AUTO: 30.6 PG (ref 26.5–33)
MCHC RBC AUTO-ENTMCNC: 34.5 G/DL (ref 31.5–36.5)
MCV RBC AUTO: 89 FL (ref 78–100)
MONOCYTES # BLD AUTO: 0.6 10E3/UL (ref 0–1.3)
MONOCYTES NFR BLD AUTO: 10 %
MUCOUS THREADS #/AREA URNS LPF: PRESENT /LPF
NEUTROPHILS # BLD AUTO: 4.6 10E3/UL (ref 1.6–8.3)
NEUTROPHILS NFR BLD AUTO: 71 %
NITRATE UR QL: NEGATIVE
PH UR STRIP: 5.5 [PH] (ref 5–7)
PLATELET # BLD AUTO: 175 10E3/UL (ref 150–450)
RBC # BLD AUTO: 4.83 10E6/UL (ref 4.4–5.9)
RBC #/AREA URNS AUTO: ABNORMAL /HPF
SP GR UR STRIP: 1.02 (ref 1–1.03)
UROBILINOGEN UR STRIP-ACNC: 0.2 E.U./DL
WBC # BLD AUTO: 6.5 10E3/UL (ref 4–11)
WBC #/AREA URNS AUTO: ABNORMAL /HPF

## 2025-01-27 PROCEDURE — 87491 CHLMYD TRACH DNA AMP PROBE: CPT

## 2025-01-27 PROCEDURE — G0103 PSA SCREENING: HCPCS

## 2025-01-27 PROCEDURE — 87591 N.GONORRHOEAE DNA AMP PROB: CPT

## 2025-01-27 PROCEDURE — 84484 ASSAY OF TROPONIN QUANT: CPT

## 2025-01-27 PROCEDURE — 85025 COMPLETE CBC W/AUTO DIFF WBC: CPT

## 2025-01-27 PROCEDURE — 93000 ELECTROCARDIOGRAM COMPLETE: CPT

## 2025-01-27 PROCEDURE — 36415 COLL VENOUS BLD VENIPUNCTURE: CPT

## 2025-01-27 PROCEDURE — 81001 URINALYSIS AUTO W/SCOPE: CPT

## 2025-01-27 PROCEDURE — 80048 BASIC METABOLIC PNL TOTAL CA: CPT

## 2025-01-27 PROCEDURE — 99215 OFFICE O/P EST HI 40 MIN: CPT

## 2025-01-27 RX ORDER — CIPROFLOXACIN 500 MG/1
500 TABLET, FILM COATED ORAL 2 TIMES DAILY
Qty: 20 TABLET | Refills: 0 | Status: CANCELLED | OUTPATIENT
Start: 2025-01-27

## 2025-01-27 NOTE — PROGRESS NOTES
Assessment & Plan     Testicular pain, right   Pt has a hx of vericocele in 2003 and has a couple of cysts that pt states that providers have not been concerned before. Pt reports that it feels like the cysts are larger.  Pt reports that 7 days ago he started having testicular pain specifically to the right. Pt reports feeling like his testicles are enlarged.  - US Testicular & Scrotum w Doppler Ltd; Future  - Adult Urology  Referral; Future    Testicular lump   Pt has a hx of vericocele in 2003 and has a couple of cysts that pt states that providers have not been concerned before. Pt reports that it feels like the cysts are larger.  Pt reports that 7 days ago he started having testicular pain specifically to the right. Pt reports feeling like his testicles are enlarged.  - US Testicular & Scrotum w Doppler Ltd; Future  - Adult Urology  Referral; Future    Urinary tract infection without hematuria, site unspecified  Pt reports that his urinary stream is weakened and doesn't feel like he is emptying his bladder completely and has to push suprapubically to completely empty his bladder.  - BASIC METABOLIC PANEL; Future  - UA with Microscopic reflex to Culture - Clinic Collect  - CBC with platelets and differential; Future  - CBC with platelets and differential  - BASIC METABOLIC PANEL  - UA Microscopic with Reflex to Culture    Special screening for malignant neoplasm of prostate  Pt reports that his urinary stream is weakened and doesn't feel like he is emptying his bladder completely and has to push suprapubically to completely empty his bladder.  - PSA, screen; Future    Special screening examination for chlamydial disease  Pt request  - Chlamydia trachomatis/Neisseria gonorrhoeae by PCR - Clinic Collect    Gonorrhea  Pt request  - Chlamydia trachomatis/Neisseria gonorrhoeae by PCR - Clinic Collect    Irregularly irregular heart rhythm  Patient denies any shortness of breath, no chest pain or  "feeling any palpitations or fluttering in chest.  - EKG 12-lead complete w/read - Clinics  - Adult Cardiology Eval  Referral; Future  - ZIO PATCH MAIL OUT; Future  - Troponin T, High Sensitivity; Future  - Troponin T, High Sensitivity          BMI  Estimated body mass index is 28.21 kg/m  as calculated from the following:    Height as of this encounter: 1.753 m (5' 9\").    Weight as of this encounter: 86.6 kg (191 lb).             Yunior Sabillon is a 74 year old, presenting for the following health issues: Pt has a hx of vericocele in 2003 and has a couple of cysts that pt states that providers have not been concerned before. Pt reports that it feels like the cysts are larger.  Pt reports that 7 days ago he started having testicular pain specifically to the right. Pt reports feeling like his testicles are enlarged. Pt reports that sitting makes it worse. Pt reports that it is progressively getting worse. Pt reports that running makes it feel better. Pt has tried a heating pad that has improved his symptoms somewhat.  Pt describes the pain as a dull ache like he has been kicked in the testicles. Pt reports that sitting can trigger the pain. Pt was last sexually active 3 years ago. Pt states that his last ejaculation from masturbation had a split stream. Pt reports that his urinary stream is weakened and doesn't feel like he is emptying his bladder completely and has to push suprapubically to completely empty his bladder. Pt denies any night sweats or chills or fevers.    Placed a urology referral for him to further evaluate cysts in his testicular sac as well as the discomfort.  An ultrasound of his testicles is ordered stat to determine what is going on.  Placed on BMP ordered to check his kidney function and PSA to check his prostate and a UA/UC to check for urinary tract infection.  Also checked him for chlamydia/gonorrhea per patient's request.    Since patient's heart rhythm was irregularly " "irregular, performed an EKG, ordered a Zio patch Holter monitor and a referral to cardiology to review the results with him.  Patient denies any shortness of breath, no chest pain or feeling any palpitations or fluttering in chest.  Testicular Problem      1/27/2025    11:03 AM   Additional Questions   Roomed by MAURICIO Marie LPN   Accompanied by self         1/27/2025    11:03 AM   Patient Reported Additional Medications   Patient reports taking the following new medications none     Via the Health Maintenance questionnaire, the patient has reported the following services have been completed -Eye Exam: [place] [facility] 2024-04-16, this information has been sent to the abstraction team.  HPI               Review of Systems  Constitutional, HEENT, cardiovascular, pulmonary, gi and gu systems are negative, except as otherwise noted.      Objective    /78   Pulse 86   Resp 20   Ht 1.753 m (5' 9\")   Wt 86.6 kg (191 lb)   SpO2 98%   BMI 28.21 kg/m    Body mass index is 28.21 kg/m .  Physical Exam   GENERAL: alert and no distress  NECK: no adenopathy, no asymmetry, masses, or scars  RESP: lungs clear to auscultation - no rales, rhonchi or wheezes  CV: irregularly irregular rhythm, normal S1 S2, no S3 or S4, no murmur, click or rub, peripheral pulses strong, and no peripheral edema  ABDOMEN: soft, nontender, no hepatosplenomegaly, no masses and bowel sounds normal   (male): testicles normal without atrophy or masses, no hernias, penis normal without urethral discharge, tenderness to palpation right testicle, and 2 cysts noted higher up in scrotal sac  MS: no gross musculoskeletal defects noted, no edema  SKIN: no suspicious lesions or rashes  NEURO: Normal strength and tone, mentation intact and speech normal  PSYCH: mentation appears normal, affect normal/bright          47 minutes spent by me on the date of the encounter doing chart review, review of outside records, review of test results, interpretation of " tests, patient visit, documentation, and reviewing several conditions thoroughly, discovering a cardiac condition that wasn't previously noted      Signed Electronically by: JATIN Junior CNP

## 2025-01-28 LAB
ANION GAP SERPL CALCULATED.3IONS-SCNC: 9 MMOL/L (ref 7–15)
BUN SERPL-MCNC: 25.9 MG/DL (ref 8–23)
C TRACH DNA SPEC QL PROBE+SIG AMP: NEGATIVE
CALCIUM SERPL-MCNC: 9.4 MG/DL (ref 8.8–10.4)
CHLORIDE SERPL-SCNC: 104 MMOL/L (ref 98–107)
CREAT SERPL-MCNC: 1.04 MG/DL (ref 0.67–1.17)
EGFRCR SERPLBLD CKD-EPI 2021: 76 ML/MIN/1.73M2
GLUCOSE SERPL-MCNC: 101 MG/DL (ref 70–99)
HCO3 SERPL-SCNC: 27 MMOL/L (ref 22–29)
N GONORRHOEA DNA SPEC QL NAA+PROBE: NEGATIVE
POTASSIUM SERPL-SCNC: 4.1 MMOL/L (ref 3.4–5.3)
PSA SERPL DL<=0.01 NG/ML-MCNC: 2.41 NG/ML (ref 0–6.5)
SODIUM SERPL-SCNC: 140 MMOL/L (ref 135–145)
SPECIMEN TYPE: NORMAL
TROPONIN T SERPL HS-MCNC: 18 NG/L

## 2025-01-29 ENCOUNTER — HOSPITAL ENCOUNTER (OUTPATIENT)
Dept: ULTRASOUND IMAGING | Facility: CLINIC | Age: 75
Discharge: HOME | End: 2025-01-29
Payer: COMMERCIAL

## 2025-01-29 DIAGNOSIS — N50.811 TESTICULAR PAIN, RIGHT: ICD-10-CM

## 2025-01-29 DIAGNOSIS — N50.89 TESTICULAR LUMP: ICD-10-CM

## 2025-01-29 PROCEDURE — 93976 VASCULAR STUDY: CPT

## 2025-02-03 ENCOUNTER — PATIENT OUTREACH (OUTPATIENT)
Dept: CARE COORDINATION | Facility: CLINIC | Age: 75
End: 2025-02-03
Payer: COMMERCIAL

## 2025-03-23 ENCOUNTER — HEALTH MAINTENANCE LETTER (OUTPATIENT)
Age: 75
End: 2025-03-23

## 2025-04-04 ENCOUNTER — TRANSFERRED RECORDS (OUTPATIENT)
Dept: MULTI SPECIALTY CLINIC | Facility: CLINIC | Age: 75
End: 2025-04-04

## 2025-04-04 LAB — RETINOPATHY: NORMAL

## 2025-04-18 ENCOUNTER — TELEPHONE (OUTPATIENT)
Dept: FAMILY MEDICINE | Facility: CLINIC | Age: 75
End: 2025-04-18

## 2025-05-02 NOTE — TELEPHONE ENCOUNTER
Call attempt 2/3.    LVM for patient to schedule annual wellness exam due Now. On callback, please assist patient in scheduling Medicare AWV.    Please close encounter when scheduled.

## 2025-05-13 ENCOUNTER — TELEPHONE (OUTPATIENT)
Dept: FAMILY MEDICINE | Facility: CLINIC | Age: 75
End: 2025-05-13
Payer: COMMERCIAL

## 2025-05-13 NOTE — TELEPHONE ENCOUNTER
Patient Quality Outreach    Patient is due for the following:   Hypertension -  BP check  Physical Preventive Adult Physical      Topic Date Due    Diptheria Tetanus Pertussis (DTAP/TDAP/TD) Vaccine (2 - Td or Tdap) 07/09/2024    COVID-19 Vaccine (4 - 2024-25 season) 09/01/2024       Action(s) Taken:    1/8/2024  9:16 AM 2/19/2024  1:34 PM 2/19/2024  1:38 PM 1/27/2025  11:05 AM 1/27/2025  11:10 AM 1/27/2025  11:11 AM 1/31/2025  3:02 PM   Vital Signs          Systolic 107  145 !  130  142 !  142 !  122  145 !    Diastolic 83  76 !  82  89 !  89 !  78  82 !      Schedule a Annual Wellness Visit    Type of outreach:    Phone, left message for patient/parent to call back.    Questions for provider review:    None         Amber Marie LPN  Chart routed to None.

## 2025-05-19 NOTE — TELEPHONE ENCOUNTER
Call attempt 3/3.    LVM for patient to schedule annual wellness exam due Now. On callback, please assist patient in scheduling Medicare AWV.    Please close encounter when scheduled.

## 2025-05-21 NOTE — TELEPHONE ENCOUNTER
Patient's home/cell number message on machine to call back or check My Chart message.  My Chart message sent to patient.

## 2025-06-18 SDOH — HEALTH STABILITY: PHYSICAL HEALTH: ON AVERAGE, HOW MANY MINUTES DO YOU ENGAGE IN EXERCISE AT THIS LEVEL?: 30 MIN

## 2025-06-18 SDOH — HEALTH STABILITY: PHYSICAL HEALTH: ON AVERAGE, HOW MANY DAYS PER WEEK DO YOU ENGAGE IN MODERATE TO STRENUOUS EXERCISE (LIKE A BRISK WALK)?: 4 DAYS

## 2025-06-18 ASSESSMENT — SOCIAL DETERMINANTS OF HEALTH (SDOH): HOW OFTEN DO YOU GET TOGETHER WITH FRIENDS OR RELATIVES?: ONCE A WEEK

## 2025-06-23 ENCOUNTER — OFFICE VISIT (OUTPATIENT)
Dept: INTERNAL MEDICINE | Facility: CLINIC | Age: 75
End: 2025-06-23
Payer: COMMERCIAL

## 2025-06-23 VITALS
BODY MASS INDEX: 26.01 KG/M2 | HEIGHT: 70 IN | OXYGEN SATURATION: 97 % | RESPIRATION RATE: 20 BRPM | SYSTOLIC BLOOD PRESSURE: 124 MMHG | DIASTOLIC BLOOD PRESSURE: 76 MMHG | WEIGHT: 181.7 LBS | HEART RATE: 91 BPM | TEMPERATURE: 97.8 F

## 2025-06-23 DIAGNOSIS — Z12.5 SCREENING FOR PROSTATE CANCER: ICD-10-CM

## 2025-06-23 DIAGNOSIS — Z00.00 ENCOUNTER FOR MEDICARE ANNUAL WELLNESS EXAM: Primary | ICD-10-CM

## 2025-06-23 DIAGNOSIS — F41.9 ANXIETY: ICD-10-CM

## 2025-06-23 DIAGNOSIS — Z13.29 SCREENING FOR THYROID DISORDER: ICD-10-CM

## 2025-06-23 DIAGNOSIS — I10 ESSENTIAL HYPERTENSION: ICD-10-CM

## 2025-06-23 DIAGNOSIS — E78.5 HYPERLIPIDEMIA WITH TARGET LDL LESS THAN 130: ICD-10-CM

## 2025-06-23 DIAGNOSIS — Z13.1 SCREENING FOR DIABETES MELLITUS: ICD-10-CM

## 2025-06-23 DIAGNOSIS — Z13.6 SCREENING FOR CARDIOVASCULAR CONDITION: ICD-10-CM

## 2025-06-23 LAB
ALBUMIN SERPL BCG-MCNC: 4.5 G/DL (ref 3.5–5.2)
ALP SERPL-CCNC: 109 U/L (ref 40–150)
ALT SERPL W P-5'-P-CCNC: 16 U/L (ref 0–70)
ANION GAP SERPL CALCULATED.3IONS-SCNC: 10 MMOL/L (ref 7–15)
AST SERPL W P-5'-P-CCNC: 19 U/L (ref 0–45)
BILIRUB SERPL-MCNC: 1 MG/DL
BUN SERPL-MCNC: 17.7 MG/DL (ref 8–23)
CALCIUM SERPL-MCNC: 9.5 MG/DL (ref 8.8–10.4)
CHLORIDE SERPL-SCNC: 100 MMOL/L (ref 98–107)
CHOLEST SERPL-MCNC: 215 MG/DL
CREAT SERPL-MCNC: 1.1 MG/DL (ref 0.67–1.17)
EGFRCR SERPLBLD CKD-EPI 2021: 70 ML/MIN/1.73M2
ERYTHROCYTE [DISTWIDTH] IN BLOOD BY AUTOMATED COUNT: 12.5 % (ref 10–15)
EST. AVERAGE GLUCOSE BLD GHB EST-MCNC: 100 MG/DL
FASTING STATUS PATIENT QL REPORTED: YES
FASTING STATUS PATIENT QL REPORTED: YES
GLUCOSE SERPL-MCNC: 89 MG/DL (ref 70–99)
HBA1C MFR BLD: 5.1 % (ref 0–5.6)
HCO3 SERPL-SCNC: 26 MMOL/L (ref 22–29)
HCT VFR BLD AUTO: 43.3 % (ref 40–53)
HDLC SERPL-MCNC: 56 MG/DL
HGB BLD-MCNC: 14.9 G/DL (ref 13.3–17.7)
LDLC SERPL CALC-MCNC: 149 MG/DL
MCH RBC QN AUTO: 30.9 PG (ref 26.5–33)
MCHC RBC AUTO-ENTMCNC: 34.4 G/DL (ref 31.5–36.5)
MCV RBC AUTO: 90 FL (ref 78–100)
NONHDLC SERPL-MCNC: 159 MG/DL
PLATELET # BLD AUTO: 174 10E3/UL (ref 150–450)
POTASSIUM SERPL-SCNC: 4.2 MMOL/L (ref 3.4–5.3)
PROT SERPL-MCNC: 7 G/DL (ref 6.4–8.3)
PSA SERPL DL<=0.01 NG/ML-MCNC: 2.03 NG/ML (ref 0–6.5)
RBC # BLD AUTO: 4.82 10E6/UL (ref 4.4–5.9)
SODIUM SERPL-SCNC: 136 MMOL/L (ref 135–145)
TRIGL SERPL-MCNC: 52 MG/DL
TSH SERPL DL<=0.005 MIU/L-ACNC: 1.49 UIU/ML (ref 0.3–4.2)
WBC # BLD AUTO: 6 10E3/UL (ref 4–11)

## 2025-06-23 PROCEDURE — 99213 OFFICE O/P EST LOW 20 MIN: CPT | Mod: 25

## 2025-06-23 PROCEDURE — 85027 COMPLETE CBC AUTOMATED: CPT

## 2025-06-23 PROCEDURE — 3074F SYST BP LT 130 MM HG: CPT

## 2025-06-23 PROCEDURE — G0103 PSA SCREENING: HCPCS

## 2025-06-23 PROCEDURE — 80053 COMPREHEN METABOLIC PANEL: CPT

## 2025-06-23 PROCEDURE — 83036 HEMOGLOBIN GLYCOSYLATED A1C: CPT

## 2025-06-23 PROCEDURE — 84443 ASSAY THYROID STIM HORMONE: CPT

## 2025-06-23 PROCEDURE — 3044F HG A1C LEVEL LT 7.0%: CPT

## 2025-06-23 PROCEDURE — G0439 PPPS, SUBSEQ VISIT: HCPCS

## 2025-06-23 PROCEDURE — 3078F DIAST BP <80 MM HG: CPT

## 2025-06-23 PROCEDURE — 36415 COLL VENOUS BLD VENIPUNCTURE: CPT

## 2025-06-23 PROCEDURE — 1126F AMNT PAIN NOTED NONE PRSNT: CPT

## 2025-06-23 PROCEDURE — 80061 LIPID PANEL: CPT

## 2025-06-23 RX ORDER — PROPRANOLOL HYDROCHLORIDE 10 MG/1
10 TABLET ORAL 3 TIMES DAILY
Qty: 90 TABLET | Refills: 3 | Status: SHIPPED | OUTPATIENT
Start: 2025-06-23

## 2025-06-23 ASSESSMENT — PAIN SCALES - GENERAL: PAINLEVEL_OUTOF10: NO PAIN (0)

## 2025-06-23 NOTE — PROGRESS NOTES
"Preventive Care Visit  [facility]  JATIN Junior CNP, Internal Medicine  Jun 23, 2025      Assessment & Plan     Encounter for Medicare annual wellness exam  Wellness screening  - REVIEW OF HEALTH MAINTENANCE PROTOCOL ORDERS  - CBC with platelets; Future  - Comprehensive metabolic panel (BMP + Alb, Alk Phos, ALT, AST, Total. Bili, TP); Future  - CBC with platelets  - Comprehensive metabolic panel (BMP + Alb, Alk Phos, ALT, AST, Total. Bili, TP)    Anxiety  Patient's biggest concern is anxiety amongst people or when he drives.  Patient's heart rate today was 91. Patient was requesting something he could take to help with anxiety when he is in a social setting or before he drives.  - propranolol (INDERAL) 10 MG tablet; Take 1 tablet (10 mg) by mouth 3 times daily.    Screening for cardiovascular condition  Pt had a CT coronary in 2019 that determines the health of his coronary arteries and was requesting one again.  - CT Coronary Calcium Scan; Future    Hyperlipidemia with target LDL less than 130  Wellness screening  - Lipid panel reflex to direct LDL Non-fasting; Future  - Lipid panel reflex to direct LDL Non-fasting    Screening for diabetes mellitus  Wellness screening  - Hemoglobin A1c; Future  - Hemoglobin A1c    Screening for thyroid disorder  Wellness screening  - TSH with free T4 reflex; Future  - TSH with free T4 reflex    Screening for prostate cancer  Wellness screening  - PSA, screen; Future  - PSA, screen    Essential hypertension  His blood pressure was 144/83 then 138/81 and then 124/76.  Patient seemed to calm down once he got situated.            BMI  Estimated body mass index is 26.45 kg/m  as calculated from the following:    Height as of this encounter: 1.765 m (5' 9.5\").    Weight as of this encounter: 82.4 kg (181 lb 11.2 oz).       Counseling  Appropriate preventive services were addressed with this patient via screening, questionnaire, or discussion as " appropriate for fall prevention, nutrition, physical activity, Tobacco-use cessation, social engagement, weight loss and cognition.  Checklist reviewing preventive services available has been given to the patient.  Reviewed patient's diet, addressing concerns and/or questions.             Yunior Sabillon is a 75 year old, presenting for the following:  Medicare Visit (Patient is fasting.)        6/23/2025     1:02 PM   Additional Questions   Roomed by Mickie POSADAS CMA   Accompanied by Self         6/23/2025     1:02 PM   Patient Reported Additional Medications   Patient reports taking the following new medications no        Via the Health Maintenance questionnaire, the patient has reported the following services have been completed -Eye Exam: [facility] 2025-04-04, this information has been sent to the abstraction team.    HPI  Patient's biggest concern is anxiety amongst people or when he drives.  Patient's heart rate today was 91. Patient was requesting something he could take to help with anxiety when he is in a social setting or before he drives.  Patient is not on any medications presently.     His blood pressure was 144/83 then 138/81 and then 124/76.  Patient seemed to calm down once he got situated. Pt had a CT coronary in 2019 that determines the health of his coronary arteries and was requesting one again.        Advance Care Planning    Discussed advance care planning with patient; however, patient declined at this time.        6/18/2025   General Health   How would you rate your overall physical health? Good   Feel stress (tense, anxious, or unable to sleep) Only a little   (!) STRESS CONCERN      6/18/2025   Nutrition   Diet: Low salt    Low fat/cholesterol       Multiple values from one day are sorted in reverse-chronological order         6/18/2025   Exercise   Days per week of moderate/strenous exercise 4 days   Average minutes spent exercising at this level 30 min         6/18/2025    Social Factors   Frequency of gathering with friends or relatives Once a week   Worry food won't last until get money to buy more No   Food not last or not have enough money for food? No   Do you have housing? (Housing is defined as stable permanent housing and does not include staying outside in a car, in a tent, in an abandoned building, in an overnight shelter, or couch-surfing.) Yes   Are you worried about losing your housing? No   Lack of transportation? No   Unable to get utilities (heat,electricity)? No         6/18/2025   Fall Risk   Fallen 2 or more times in the past year? No   Trouble with walking or balance? No          6/18/2025   Activities of Daily Living- Home Safety   Needs help with the following daily activites None of the above   Safety concerns in the home None of the above         6/18/2025   Dental   Dentist two times every year? Yes         6/18/2025   Hearing Screening   Hearing concerns? None of the above         6/18/2025   Driving Risk Screening   Patient/family members have concerns about driving No         6/18/2025   General Alertness/Fatigue Screening   Have you been more tired than usual lately? No         6/18/2025   Urinary Incontinence Screening   Bothered by leaking urine in past 6 months No         Today's PHQ-2 Score:       6/22/2025     4:22 PM   PHQ-2 ( 1999 [employer])   Q1: Little interest or pleasure in doing things 0   Q2: Feeling down, depressed or hopeless 0   PHQ-2 Score 0    Q1: Little interest or pleasure in doing things Not at all   Q2: Feeling down, depressed or hopeless Not at all   PHQ-2 Score 0       Patient-reported           6/18/2025   Substance Use   Alcohol more than 3/day or more than 7/wk Not Applicable   Do you have a current opioid prescription? No   How severe/bad is pain from 1 to 10? 1/10   Do you use any other substances recreationally? No     Social History     Tobacco Use    Smoking status: Never     Passive exposure: Never    Smokeless tobacco:  Never   Vaping Use    Vaping status: Never Used   Substance Use Topics    Alcohol use: Not Currently    Drug use: Never           6/18/2025   AAA Screening   Family history of Abdominal Aortic Aneurysm (AAA)? No   ASCVD Risk   The 10-year ASCVD risk score (Dandy GONZALEZ, et al., 2019) is: 25.9%    Values used to calculate the score:      Age: 75 years      Sex: Male      Is Non- : No      Diabetic: No      Tobacco smoker: No      Systolic Blood Pressure: 138 mmHg      Is BP treated: No      HDL Cholesterol: 50 mg/dL      Total Cholesterol: 194 mg/dL            Reviewed and updated as needed this visit by Provider                    Past Medical History:   Diagnosis Date    Arthritis 2014    Big left toe    Atypical nevus 07/09/2014     Do you wish to do the replacement in the background? 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 Provider to review and confirm.    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    Oropharyngeal dysphagia 08/02/2021    Overweight 08/02/2021    Papule 08/02/2021    Posterior vitreous detachment, bilateral 02/19/2024    Primary insomnia 08/02/2021    Pure hypercholesterolemia     Seborrheic keratosis 08/02/2021    Spider veins     Urinary urgency 08/02/2021     Past Surgical History:   Procedure Laterality Date    COLONOSCOPY N/A 1/8/2024    Procedure: Colonoscopy;  Surgeon: Bronson Campa MD;  Location:  GI    DENTAL SURGERY      wisdom teeth removal    HC REMOVAL OF TONSILS,<13 Y/O      SOFT TISSUE SURGERY  1995    egg sized fibroid tumor on side of right calf    ZZC NONSPECIFIC PROCEDURE  1995    right leg fibroid tumor removal     Current providers sharing  "in care for this patient include:  Patient Care Team:  Maia Cruz APRN CNP as PCP - General (Internal Medicine)  Ben Morales MD as MD (Urology)  Sid Burden MD as MD (Urology)  Jose Hodges MD as MD (Cardiovascular Disease)  Sid Burden MD as Assigned Surgical Provider  Maia Cruz APRN CNP as Assigned PCP    The following health maintenance items are reviewed in Epic and correct as of today:  Health Maintenance   Topic Date Due    RSV VACCINE (1 - Risk 60-74 years 1-dose series) Never done    DTAP/TDAP/TD VACCINE (2 - Td or Tdap) 07/09/2024    COVID-19 VACCINE (4 - 2024-25 season) 09/01/2024    MEDICARE ANNUAL WELLNESS VISIT  02/19/2025    LIPID  02/19/2025    ANNUAL REVIEW OF HM ORDERS  02/19/2025    EYE EXAM  04/16/2025    INFLUENZA VACCINE (Season Ended) 09/01/2025    BMP  01/27/2026    FALL RISK ASSESSMENT  06/23/2026    DIABETES SCREENING  01/27/2028    ADVANCE CARE PLANNING  02/19/2029    COLORECTAL CANCER SCREENING  01/08/2034    HEPATITIS C SCREENING  Completed    PHQ-2 (once per calendar year)  Completed    PNEUMOCOCCAL VACCINE 50+ YEARS  Completed    ZOSTER VACCINE  Completed    HPV VACCINE  Aged Out    MENINGITIS VACCINE  Aged Out         Review of Systems  Constitutional, neuro, ENT, endocrine, pulmonary, cardiac, gastrointestinal, genitourinary, musculoskeletal, integument and psychiatric systems are negative, except as otherwise noted.     Objective    Exam  /81 (BP Location: Left arm, Patient Position: Sitting, Cuff Size: Adult Regular)   Pulse 91   Temp 97.8  F (36.6  C) (Oral)   Resp 20   Ht 1.765 m (5' 9.5\")   Wt 82.4 kg (181 lb 11.2 oz)   SpO2 97%   BMI 26.45 kg/m     Estimated body mass index is 26.45 kg/m  as calculated from the following:    Height as of this encounter: 1.765 m (5' 9.5\").    Weight as of this encounter: 82.4 kg (181 lb 11.2 oz).    Physical Exam  GENERAL: alert and no distress  HENT: normal cephalic/atraumatic, right " ear: normal: no effusions, no erythema, normal landmarks and occluded with wax, left ear: normal: no effusions, no erythema, normal landmarks and occluded with wax, nose and mouth without ulcers or lesions, oropharynx clear, and oral mucous membranes moist  NECK: no adenopathy, no asymmetry, masses, or scars  RESP: lungs clear to auscultation - no rales, rhonchi or wheezes  CV: regular rate and rhythm, normal S1 S2, no S3 or S4, no murmur, click or rub, no peripheral edema  ABDOMEN: soft, nontender, no hepatosplenomegaly, no masses and bowel sounds normal  MS: no gross musculoskeletal defects noted, no edema  SKIN: no suspicious lesions or rashes  NEURO: Normal strength and tone, mentation intact and speech normal  PSYCH: mentation appears normal, affect normal/bright         6/23/2025   Mini Cog   Clock Draw Score 2 Normal   3 Item Recall 3 objects recalled   Mini Cog Total Score 5              Signed Electronically by: JATIN Junior CNP

## 2025-06-24 ENCOUNTER — RESULTS FOLLOW-UP (OUTPATIENT)
Dept: INTERNAL MEDICINE | Facility: CLINIC | Age: 75
End: 2025-06-24

## (undated) DEVICE — KIT ENDO TURNOVER/PROCEDURE W/CLEAN A SCOPE LINERS 103888

## (undated) RX ORDER — SIMETHICONE 40MG/0.6ML
SUSPENSION, DROPS(FINAL DOSAGE FORM)(ML) ORAL
Status: DISPENSED
Start: 2024-01-08

## (undated) RX ORDER — FENTANYL CITRATE 50 UG/ML
INJECTION, SOLUTION INTRAMUSCULAR; INTRAVENOUS
Status: DISPENSED
Start: 2024-01-08